# Patient Record
Sex: FEMALE | Race: WHITE | NOT HISPANIC OR LATINO | Employment: OTHER | URBAN - METROPOLITAN AREA
[De-identification: names, ages, dates, MRNs, and addresses within clinical notes are randomized per-mention and may not be internally consistent; named-entity substitution may affect disease eponyms.]

---

## 2017-01-27 ENCOUNTER — OFFICE VISIT - HEALTHEAST (OUTPATIENT)
Dept: FAMILY MEDICINE | Facility: CLINIC | Age: 52
End: 2017-01-27

## 2017-01-27 DIAGNOSIS — Z12.31 VISIT FOR SCREENING MAMMOGRAM: ICD-10-CM

## 2017-01-27 DIAGNOSIS — R31.9 HEMATURIA: ICD-10-CM

## 2017-01-27 DIAGNOSIS — S33.5XXA LUMBAR SPRAIN, INITIAL ENCOUNTER: ICD-10-CM

## 2017-01-27 ASSESSMENT — MIFFLIN-ST. JEOR: SCORE: 1179.88

## 2017-01-30 ENCOUNTER — AMBULATORY - HEALTHEAST (OUTPATIENT)
Dept: LAB | Facility: CLINIC | Age: 52
End: 2017-01-30

## 2017-01-30 DIAGNOSIS — R31.9 HEMATURIA: ICD-10-CM

## 2017-02-01 ENCOUNTER — OFFICE VISIT - HEALTHEAST (OUTPATIENT)
Dept: PHYSICAL THERAPY | Facility: REHABILITATION | Age: 52
End: 2017-02-01

## 2017-02-01 ENCOUNTER — COMMUNICATION - HEALTHEAST (OUTPATIENT)
Dept: FAMILY MEDICINE | Facility: CLINIC | Age: 52
End: 2017-02-01

## 2017-02-01 DIAGNOSIS — M54.50 ACUTE LEFT-SIDED LOW BACK PAIN WITHOUT SCIATICA: ICD-10-CM

## 2017-02-01 DIAGNOSIS — M62.81 GENERALIZED MUSCLE WEAKNESS: ICD-10-CM

## 2017-02-01 DIAGNOSIS — R29.3 ABNORMAL POSTURE: ICD-10-CM

## 2017-02-01 DIAGNOSIS — M62.89 MUSCLE TIGHTNESS: ICD-10-CM

## 2017-02-03 ENCOUNTER — COMMUNICATION - HEALTHEAST (OUTPATIENT)
Dept: FAMILY MEDICINE | Facility: CLINIC | Age: 52
End: 2017-02-03

## 2017-02-08 ENCOUNTER — OFFICE VISIT - HEALTHEAST (OUTPATIENT)
Dept: PHYSICAL THERAPY | Facility: REHABILITATION | Age: 52
End: 2017-02-08

## 2017-02-08 DIAGNOSIS — M54.50 ACUTE LEFT-SIDED LOW BACK PAIN WITHOUT SCIATICA: ICD-10-CM

## 2017-02-08 DIAGNOSIS — M62.89 MUSCLE TIGHTNESS: ICD-10-CM

## 2017-02-08 DIAGNOSIS — M62.81 GENERALIZED MUSCLE WEAKNESS: ICD-10-CM

## 2017-02-08 DIAGNOSIS — R29.3 ABNORMAL POSTURE: ICD-10-CM

## 2017-03-24 ENCOUNTER — RECORDS - HEALTHEAST (OUTPATIENT)
Dept: MAMMOGRAPHY | Facility: CLINIC | Age: 52
End: 2017-03-24

## 2017-03-24 DIAGNOSIS — Z12.31 ENCOUNTER FOR SCREENING MAMMOGRAM FOR MALIGNANT NEOPLASM OF BREAST: ICD-10-CM

## 2017-05-08 ENCOUNTER — COMMUNICATION - HEALTHEAST (OUTPATIENT)
Dept: FAMILY MEDICINE | Facility: CLINIC | Age: 52
End: 2017-05-08
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
## 2017-11-01 ENCOUNTER — AMBULATORY - HEALTHEAST (OUTPATIENT)
Dept: NURSING | Facility: CLINIC | Age: 52
End: 2017-11-01

## 2017-11-01 ENCOUNTER — AMBULATORY - HEALTHEAST (OUTPATIENT)
Dept: LAB | Facility: CLINIC | Age: 52
End: 2017-11-01

## 2017-11-01 ENCOUNTER — AMBULATORY - HEALTHEAST (OUTPATIENT)
Dept: FAMILY MEDICINE | Facility: CLINIC | Age: 52
End: 2017-11-01

## 2017-11-01 ENCOUNTER — COMMUNICATION - HEALTHEAST (OUTPATIENT)
Dept: FAMILY MEDICINE | Facility: CLINIC | Age: 52
End: 2017-11-01

## 2017-11-01 DIAGNOSIS — R30.0 DYSURIA: ICD-10-CM

## 2017-11-01 DIAGNOSIS — Z23 NEED FOR IMMUNIZATION AGAINST INFLUENZA: ICD-10-CM

## 2018-01-16 ENCOUNTER — COMMUNICATION - HEALTHEAST (OUTPATIENT)
Dept: FAMILY MEDICINE | Facility: CLINIC | Age: 53
End: 2018-01-16

## 2018-01-19 ENCOUNTER — AMBULATORY - HEALTHEAST (OUTPATIENT)
Dept: NURSING | Facility: CLINIC | Age: 53
End: 2018-01-19

## 2018-02-11 ENCOUNTER — COMMUNICATION - HEALTHEAST (OUTPATIENT)
Dept: FAMILY MEDICINE | Facility: CLINIC | Age: 53
End: 2018-02-11

## 2018-02-20 ENCOUNTER — OFFICE VISIT - HEALTHEAST (OUTPATIENT)
Dept: FAMILY MEDICINE | Facility: CLINIC | Age: 53
End: 2018-02-20

## 2018-02-20 DIAGNOSIS — E78.5 HYPERLIPIDEMIA: ICD-10-CM

## 2018-02-20 DIAGNOSIS — I10 ESSENTIAL HYPERTENSION, BENIGN: ICD-10-CM

## 2018-02-20 DIAGNOSIS — E55.9 VITAMIN D DEFICIENCY: ICD-10-CM

## 2018-02-20 DIAGNOSIS — R00.2 PALPITATIONS: ICD-10-CM

## 2018-02-20 DIAGNOSIS — Z00.00 ROUTINE GENERAL MEDICAL EXAMINATION AT A HEALTH CARE FACILITY: ICD-10-CM

## 2018-02-20 LAB
ALBUMIN SERPL-MCNC: 4.1 G/DL (ref 3.5–5)
ALP SERPL-CCNC: 96 U/L (ref 45–120)
ALT SERPL W P-5'-P-CCNC: 17 U/L (ref 0–45)
ANION GAP SERPL CALCULATED.3IONS-SCNC: 7 MMOL/L (ref 5–18)
AST SERPL W P-5'-P-CCNC: 19 U/L (ref 0–40)
BASOPHILS # BLD AUTO: 0 THOU/UL (ref 0–0.2)
BASOPHILS NFR BLD AUTO: 1 % (ref 0–2)
BILIRUB SERPL-MCNC: 0.6 MG/DL (ref 0–1)
BUN SERPL-MCNC: 9 MG/DL (ref 8–22)
CALCIUM SERPL-MCNC: 10 MG/DL (ref 8.5–10.5)
CHLORIDE BLD-SCNC: 98 MMOL/L (ref 98–107)
CHOLEST SERPL-MCNC: 249 MG/DL
CO2 SERPL-SCNC: 30 MMOL/L (ref 22–31)
CREAT SERPL-MCNC: 0.69 MG/DL (ref 0.6–1.1)
EOSINOPHIL # BLD AUTO: 0.1 THOU/UL (ref 0–0.4)
EOSINOPHIL NFR BLD AUTO: 2 % (ref 0–6)
ERYTHROCYTE [DISTWIDTH] IN BLOOD BY AUTOMATED COUNT: 12.9 % (ref 11–14.5)
FASTING STATUS PATIENT QL REPORTED: YES
GFR SERPL CREATININE-BSD FRML MDRD: >60 ML/MIN/1.73M2
GLUCOSE BLD-MCNC: 96 MG/DL (ref 70–125)
HCT VFR BLD AUTO: 40.6 % (ref 35–47)
HDLC SERPL-MCNC: 75 MG/DL
HGB BLD-MCNC: 14.2 G/DL (ref 12–16)
LDLC SERPL CALC-MCNC: 157 MG/DL
LYMPHOCYTES # BLD AUTO: 1.1 THOU/UL (ref 0.8–4.4)
LYMPHOCYTES NFR BLD AUTO: 27 % (ref 20–40)
MCH RBC QN AUTO: 31.2 PG (ref 27–34)
MCHC RBC AUTO-ENTMCNC: 35 G/DL (ref 32–36)
MCV RBC AUTO: 89 FL (ref 80–100)
MONOCYTES # BLD AUTO: 0.2 THOU/UL (ref 0–0.9)
MONOCYTES NFR BLD AUTO: 6 % (ref 2–10)
NEUTROPHILS # BLD AUTO: 2.7 THOU/UL (ref 2–7.7)
NEUTROPHILS NFR BLD AUTO: 65 % (ref 50–70)
PLATELET # BLD AUTO: 250 THOU/UL (ref 140–440)
PMV BLD AUTO: 9 FL (ref 7–10)
POTASSIUM BLD-SCNC: 4.3 MMOL/L (ref 3.5–5)
PROT SERPL-MCNC: 7.1 G/DL (ref 6–8)
RBC # BLD AUTO: 4.56 MILL/UL (ref 3.8–5.4)
SODIUM SERPL-SCNC: 135 MMOL/L (ref 136–145)
TRIGL SERPL-MCNC: 85 MG/DL
WBC: 4.2 THOU/UL (ref 4–11)

## 2018-02-20 ASSESSMENT — MIFFLIN-ST. JEOR: SCORE: 1188.95

## 2018-02-21 ENCOUNTER — HOSPITAL ENCOUNTER (OUTPATIENT)
Dept: CARDIOLOGY | Facility: HOSPITAL | Age: 53
Discharge: HOME | End: 2018-02-21
Attending: FAMILY MEDICINE

## 2018-02-21 DIAGNOSIS — R00.2 PALPITATIONS: ICD-10-CM

## 2018-02-21 LAB — 25(OH)D3 SERPL-MCNC: 43.3 NG/ML (ref 30–80)

## 2018-02-26 ENCOUNTER — COMMUNICATION - HEALTHEAST (OUTPATIENT)
Dept: FAMILY MEDICINE | Facility: CLINIC | Age: 53
End: 2018-02-26

## 2018-03-27 ENCOUNTER — COMMUNICATION - HEALTHEAST (OUTPATIENT)
Dept: FAMILY MEDICINE | Facility: CLINIC | Age: 53
End: 2018-03-27

## 2018-05-11 ENCOUNTER — RECORDS - HEALTHEAST (OUTPATIENT)
Dept: MAMMOGRAPHY | Facility: CLINIC | Age: 53
End: 2018-05-11

## 2018-05-11 DIAGNOSIS — Z12.31 ENCOUNTER FOR SCREENING MAMMOGRAM FOR MALIGNANT NEOPLASM OF BREAST: ICD-10-CM

## 2018-07-31 ENCOUNTER — OFFICE VISIT - HEALTHEAST (OUTPATIENT)
Dept: FAMILY MEDICINE | Facility: CLINIC | Age: 53
End: 2018-07-31

## 2018-07-31 DIAGNOSIS — R30.9 PAINFUL URINATION: ICD-10-CM

## 2018-07-31 DIAGNOSIS — M62.81 GENERALIZED MUSCLE WEAKNESS: ICD-10-CM

## 2018-07-31 LAB
ALBUMIN UR-MCNC: NEGATIVE MG/DL
APPEARANCE UR: CLEAR
BILIRUB UR QL STRIP: NEGATIVE
COLOR UR AUTO: YELLOW
DEPRECATED S PYO AG THROAT QL EIA: NORMAL
ERYTHROCYTE [DISTWIDTH] IN BLOOD BY AUTOMATED COUNT: 11.5 % (ref 11–14.5)
GLUCOSE UR STRIP-MCNC: NEGATIVE MG/DL
HCT VFR BLD AUTO: 43.3 % (ref 35–47)
HGB BLD-MCNC: 14.5 G/DL (ref 12–16)
HGB UR QL STRIP: NEGATIVE
KETONES UR STRIP-MCNC: NEGATIVE MG/DL
LEUKOCYTE ESTERASE UR QL STRIP: NEGATIVE
MCH RBC QN AUTO: 29.7 PG (ref 27–34)
MCHC RBC AUTO-ENTMCNC: 33.4 G/DL (ref 32–36)
MCV RBC AUTO: 89 FL (ref 80–100)
NITRATE UR QL: NEGATIVE
PH UR STRIP: 7.5 [PH] (ref 5–8)
PLATELET # BLD AUTO: 269 THOU/UL (ref 140–440)
PMV BLD AUTO: 8.7 FL (ref 7–10)
RBC # BLD AUTO: 4.86 MILL/UL (ref 3.8–5.4)
SP GR UR STRIP: 1.01 (ref 1–1.03)
UROBILINOGEN UR STRIP-ACNC: NORMAL
WBC: 5.4 THOU/UL (ref 4–11)

## 2018-08-01 LAB
GROUP A STREP BY PCR: NORMAL
LYME TOTAL ANTIBODY - HISTORICAL: 0.05 INDEX VALUE

## 2018-08-02 LAB — B MICROTI IGG TITR SER: NORMAL {TITER}

## 2018-08-03 LAB
A PHAGOCYTOPH DNA BLD QL NAA+PROBE: NOT DETECTED
E CHAFFEENSIS DNA BLD QL NAA+PROBE: NOT DETECTED
E EWINGII DNA SPEC QL NAA+PROBE: NOT DETECTED
EHRLICHIA DNA SPEC QL NAA+PROBE: NOT DETECTED

## 2018-12-02 ENCOUNTER — OFFICE VISIT - HEALTHEAST (OUTPATIENT)
Dept: FAMILY MEDICINE | Facility: CLINIC | Age: 53
End: 2018-12-02

## 2018-12-02 DIAGNOSIS — B02.9 HERPES ZOSTER WITHOUT COMPLICATION: ICD-10-CM

## 2018-12-03 ENCOUNTER — COMMUNICATION - HEALTHEAST (OUTPATIENT)
Dept: SCHEDULING | Facility: CLINIC | Age: 53
End: 2018-12-03

## 2018-12-05 ENCOUNTER — OFFICE VISIT - HEALTHEAST (OUTPATIENT)
Dept: FAMILY MEDICINE | Facility: CLINIC | Age: 53
End: 2018-12-05

## 2018-12-05 DIAGNOSIS — B02.9 HERPES ZOSTER WITHOUT COMPLICATION: ICD-10-CM

## 2018-12-05 ASSESSMENT — MIFFLIN-ST. JEOR: SCORE: 1153.99

## 2018-12-07 ENCOUNTER — COMMUNICATION - HEALTHEAST (OUTPATIENT)
Dept: FAMILY MEDICINE | Facility: CLINIC | Age: 53
End: 2018-12-07

## 2019-02-10 ENCOUNTER — COMMUNICATION - HEALTHEAST (OUTPATIENT)
Dept: FAMILY MEDICINE | Facility: CLINIC | Age: 54
End: 2019-02-10

## 2019-06-03 ENCOUNTER — OFFICE VISIT - HEALTHEAST (OUTPATIENT)
Dept: FAMILY MEDICINE | Facility: CLINIC | Age: 54
End: 2019-06-03

## 2019-06-03 DIAGNOSIS — Z80.3 FAMILY HISTORY OF BREAST CANCER IN MOTHER: ICD-10-CM

## 2019-06-03 DIAGNOSIS — G56.23 ULNAR NEUROPATHY OF BOTH UPPER EXTREMITIES: ICD-10-CM

## 2019-06-03 DIAGNOSIS — G56.03 BILATERAL CARPAL TUNNEL SYNDROME: ICD-10-CM

## 2019-06-03 DIAGNOSIS — Z13.220 SCREENING FOR CHOLESTEROL LEVEL: ICD-10-CM

## 2019-06-03 DIAGNOSIS — Z12.4 SCREENING FOR CERVICAL CANCER: ICD-10-CM

## 2019-06-03 DIAGNOSIS — E55.9 VITAMIN D DEFICIENCY: ICD-10-CM

## 2019-06-03 DIAGNOSIS — I10 ESSENTIAL HYPERTENSION, BENIGN: ICD-10-CM

## 2019-06-03 DIAGNOSIS — Z13.1 SCREENING FOR DIABETES MELLITUS: ICD-10-CM

## 2019-06-03 DIAGNOSIS — N95.1 MENOPAUSAL SYNDROME (HOT FLASHES): ICD-10-CM

## 2019-06-03 DIAGNOSIS — Z13.0 SCREENING FOR DEFICIENCY ANEMIA: ICD-10-CM

## 2019-06-03 DIAGNOSIS — Z12.31 VISIT FOR SCREENING MAMMOGRAM: ICD-10-CM

## 2019-06-03 DIAGNOSIS — Z00.00 ROUTINE GENERAL MEDICAL EXAMINATION AT A HEALTH CARE FACILITY: ICD-10-CM

## 2019-06-03 LAB
ANION GAP SERPL CALCULATED.3IONS-SCNC: 8 MMOL/L (ref 5–18)
BUN SERPL-MCNC: 12 MG/DL (ref 8–22)
CALCIUM SERPL-MCNC: 10.4 MG/DL (ref 8.5–10.5)
CHLORIDE BLD-SCNC: 100 MMOL/L (ref 98–107)
CHOLEST SERPL-MCNC: 267 MG/DL
CO2 SERPL-SCNC: 30 MMOL/L (ref 22–31)
CREAT SERPL-MCNC: 0.69 MG/DL (ref 0.6–1.1)
FASTING STATUS PATIENT QL REPORTED: YES
GFR SERPL CREATININE-BSD FRML MDRD: >60 ML/MIN/1.73M2
GLUCOSE BLD-MCNC: 91 MG/DL (ref 70–125)
HDLC SERPL-MCNC: 84 MG/DL
HGB BLD-MCNC: 14.5 G/DL (ref 12–16)
LDLC SERPL CALC-MCNC: 167 MG/DL
POTASSIUM BLD-SCNC: 4.5 MMOL/L (ref 3.5–5)
SODIUM SERPL-SCNC: 138 MMOL/L (ref 136–145)
TRIGL SERPL-MCNC: 82 MG/DL

## 2019-06-03 ASSESSMENT — MIFFLIN-ST. JEOR: SCORE: 1145.86

## 2019-06-04 ENCOUNTER — AMBULATORY - HEALTHEAST (OUTPATIENT)
Dept: NURSING | Facility: CLINIC | Age: 54
End: 2019-06-04

## 2019-06-04 DIAGNOSIS — Z00.00 ROUTINE GENERAL MEDICAL EXAMINATION AT A HEALTH CARE FACILITY: ICD-10-CM

## 2019-06-04 LAB
25(OH)D3 SERPL-MCNC: 42 NG/ML (ref 30–80)
25(OH)D3 SERPL-MCNC: 42 NG/ML (ref 30–80)
HPV SOURCE: NORMAL
HUMAN PAPILLOMA VIRUS 16 DNA: NEGATIVE
HUMAN PAPILLOMA VIRUS 18 DNA: NEGATIVE
HUMAN PAPILLOMA VIRUS FINAL DIAGNOSIS: NORMAL
HUMAN PAPILLOMA VIRUS OTHER HR: NEGATIVE
SPECIMEN DESCRIPTION: NORMAL

## 2019-06-12 ENCOUNTER — RECORDS - HEALTHEAST (OUTPATIENT)
Dept: MAMMOGRAPHY | Facility: CLINIC | Age: 54
End: 2019-06-12

## 2019-06-12 ENCOUNTER — COMMUNICATION - HEALTHEAST (OUTPATIENT)
Dept: FAMILY MEDICINE | Facility: CLINIC | Age: 54
End: 2019-06-12

## 2019-06-12 DIAGNOSIS — Z12.31 ENCOUNTER FOR SCREENING MAMMOGRAM FOR MALIGNANT NEOPLASM OF BREAST: ICD-10-CM

## 2019-06-12 DIAGNOSIS — Z80.3 FAMILY HISTORY OF MALIGNANT NEOPLASM OF BREAST: ICD-10-CM

## 2019-06-24 ENCOUNTER — RECORDS - HEALTHEAST (OUTPATIENT)
Dept: ADMINISTRATIVE | Facility: OTHER | Age: 54
End: 2019-06-24

## 2019-06-28 ENCOUNTER — RECORDS - HEALTHEAST (OUTPATIENT)
Dept: ADMINISTRATIVE | Facility: OTHER | Age: 54
End: 2019-06-28

## 2019-07-29 ENCOUNTER — COMMUNICATION - HEALTHEAST (OUTPATIENT)
Dept: FAMILY MEDICINE | Facility: CLINIC | Age: 54
End: 2019-07-29
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
## 2019-08-12 ENCOUNTER — COMMUNICATION - HEALTHEAST (OUTPATIENT)
Dept: SCHEDULING | Facility: CLINIC | Age: 54
End: 2019-08-12

## 2019-08-13 ENCOUNTER — RECORDS - HEALTHEAST (OUTPATIENT)
Dept: GENERAL RADIOLOGY | Facility: CLINIC | Age: 54
End: 2019-08-13

## 2019-08-13 ENCOUNTER — OFFICE VISIT - HEALTHEAST (OUTPATIENT)
Dept: FAMILY MEDICINE | Facility: CLINIC | Age: 54
End: 2019-08-13

## 2019-08-13 DIAGNOSIS — S20.219A CONTUSION OF UNSPECIFIED FRONT WALL OF THORAX, INITIAL ENCOUNTER: ICD-10-CM

## 2019-08-13 DIAGNOSIS — S20.219A CONTUSION OF RIB, UNSPECIFIED LATERALITY, INITIAL ENCOUNTER: ICD-10-CM
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
## 2019-08-14 ENCOUNTER — AMBULATORY - HEALTHEAST (OUTPATIENT)
Dept: FAMILY MEDICINE | Facility: CLINIC | Age: 54
End: 2019-08-14
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
## 2019-08-14 LAB — B BURGDOR IGG+IGM SER QL: 0.05 INDEX VALUE

## 2019-08-17 LAB
A PHAGOCYTOPH DNA BLD QL NAA+PROBE: NOT DETECTED
B MICROTI DNA BLD QL NAA+PROBE: NOT DETECTED
BABESIA DNA BLD QL NAA+PROBE: NOT DETECTED
E CHAFFEENSIS DNA BLD QL NAA+PROBE: NOT DETECTED
E EWINGII DNA SPEC QL NAA+PROBE: NOT DETECTED
EHRLICHIA DNA SPEC QL NAA+PROBE: NOT DETECTED

## 2019-09-12 ENCOUNTER — COMMUNICATION - HEALTHEAST (OUTPATIENT)
Dept: FAMILY MEDICINE | Facility: CLINIC | Age: 54
End: 2019-09-12

## 2019-09-17 ENCOUNTER — AMBULATORY - HEALTHEAST (OUTPATIENT)
Dept: NURSING | Facility: CLINIC | Age: 54
End: 2019-09-17

## 2019-10-10 ENCOUNTER — COMMUNICATION - HEALTHEAST (OUTPATIENT)
Dept: FAMILY MEDICINE | Facility: CLINIC | Age: 54
End: 2019-10-10

## 2019-10-10 DIAGNOSIS — N95.1 MENOPAUSAL SYNDROME (HOT FLASHES): ICD-10-CM

## 2019-11-01 ENCOUNTER — AMBULATORY - HEALTHEAST (OUTPATIENT)
Dept: FAMILY MEDICINE | Facility: CLINIC | Age: 54
End: 2019-11-01

## 2019-11-05 ENCOUNTER — AMBULATORY - HEALTHEAST (OUTPATIENT)
Dept: NURSING | Facility: CLINIC | Age: 54
End: 2019-11-05

## 2019-11-11 ENCOUNTER — AMBULATORY - HEALTHEAST (OUTPATIENT)
Dept: LAB | Facility: CLINIC | Age: 54
End: 2019-11-11

## 2019-11-11 ENCOUNTER — AMBULATORY - HEALTHEAST (OUTPATIENT)
Dept: FAMILY MEDICINE | Facility: CLINIC | Age: 54
End: 2019-11-11

## 2019-11-11 DIAGNOSIS — R30.0 DYSURIA: ICD-10-CM

## 2019-11-11 LAB
ALBUMIN UR-MCNC: NEGATIVE MG/DL
APPEARANCE UR: CLEAR
BILIRUB UR QL STRIP: NEGATIVE
COLOR UR AUTO: YELLOW
GLUCOSE UR STRIP-MCNC: NEGATIVE MG/DL
HGB UR QL STRIP: NEGATIVE
KETONES UR STRIP-MCNC: NEGATIVE MG/DL
LEUKOCYTE ESTERASE UR QL STRIP: NEGATIVE
NITRATE UR QL: NEGATIVE
PH UR STRIP: 7.5 [PH] (ref 5–8)
SP GR UR STRIP: 1.01 (ref 1–1.03)
UROBILINOGEN UR STRIP-ACNC: NORMAL

## 2019-12-11 ENCOUNTER — RECORDS - HEALTHEAST (OUTPATIENT)
Dept: ADMINISTRATIVE | Facility: OTHER | Age: 54
End: 2019-12-11

## 2019-12-19 ENCOUNTER — RECORDS - HEALTHEAST (OUTPATIENT)
Dept: ADMINISTRATIVE | Facility: OTHER | Age: 54
End: 2019-12-19

## 2020-02-09 ENCOUNTER — COMMUNICATION - HEALTHEAST (OUTPATIENT)
Dept: FAMILY MEDICINE | Facility: CLINIC | Age: 55
End: 2020-02-09

## 2020-02-09 DIAGNOSIS — I10 ESSENTIAL HYPERTENSION, BENIGN: ICD-10-CM

## 2020-06-15 ENCOUNTER — COMMUNICATION - HEALTHEAST (OUTPATIENT)
Dept: FAMILY MEDICINE | Facility: CLINIC | Age: 55
End: 2020-06-15

## 2020-06-15 DIAGNOSIS — N95.1 MENOPAUSAL SYNDROME (HOT FLASHES): ICD-10-CM

## 2020-06-30 ENCOUNTER — COMMUNICATION - HEALTHEAST (OUTPATIENT)
Dept: FAMILY MEDICINE | Facility: CLINIC | Age: 55
End: 2020-06-30

## 2020-07-20 ENCOUNTER — OFFICE VISIT - HEALTHEAST (OUTPATIENT)
Dept: FAMILY MEDICINE | Facility: CLINIC | Age: 55
End: 2020-07-20

## 2020-07-20 DIAGNOSIS — J02.9 PHARYNGITIS, UNSPECIFIED ETIOLOGY: ICD-10-CM

## 2020-07-20 DIAGNOSIS — Z20.9 EXPOSURE TO POTENTIAL INFECTION: ICD-10-CM

## 2020-07-20 DIAGNOSIS — F33.8 SEASONAL AFFECTIVE DISORDER (H): ICD-10-CM

## 2020-07-20 DIAGNOSIS — R51.9 NONINTRACTABLE HEADACHE, UNSPECIFIED CHRONICITY PATTERN, UNSPECIFIED HEADACHE TYPE: ICD-10-CM

## 2020-07-20 DIAGNOSIS — R63.0 POOR APPETITE: ICD-10-CM

## 2020-07-20 DIAGNOSIS — R19.7 DIARRHEA, UNSPECIFIED TYPE: ICD-10-CM

## 2020-07-20 LAB — DEPRECATED S PYO AG THROAT QL EIA: NORMAL

## 2020-07-20 NOTE — ASSESSMENT & PLAN NOTE
"2 days   Suddenly Late PM 5 PM  Nausea No  Vomiting No   Photo or PhonoNo    Like a \" comes and goes\"  Minor  Taste \"nothing taste good\"    "

## 2020-07-21 ENCOUNTER — COMMUNICATION - HEALTHEAST (OUTPATIENT)
Dept: FAMILY MEDICINE | Facility: CLINIC | Age: 55
End: 2020-07-21

## 2020-07-21 LAB — GROUP A STREP BY PCR: NORMAL

## 2020-07-22 LAB
B MICROTI IGG TITR SER: NORMAL {TITER}
E CHAFFEENSIS IGG TITR SER IF: NORMAL {TITER}
E CHAFFEENSIS IGM TITR SER IF: NORMAL {TITER}

## 2020-07-23 ENCOUNTER — COMMUNICATION - HEALTHEAST (OUTPATIENT)
Dept: FAMILY MEDICINE | Facility: CLINIC | Age: 55
End: 2020-07-23

## 2020-07-24 ENCOUNTER — COMMUNICATION - HEALTHEAST (OUTPATIENT)
Dept: FAMILY MEDICINE | Facility: CLINIC | Age: 55
End: 2020-07-24

## 2020-07-24 LAB
B BURGDOR AB SER-IMP: NORMAL
LYME AB IGG BAND(S): NORMAL
LYME AB IGM BAND(S): NORMAL
LYME IGG BLOT: NEGATIVE
LYME IGM BLOT: NEGATIVE

## 2020-07-26 ENCOUNTER — COMMUNICATION - HEALTHEAST (OUTPATIENT)
Dept: FAMILY MEDICINE | Facility: CLINIC | Age: 55
End: 2020-07-26

## 2020-07-27 ENCOUNTER — HOSPITAL ENCOUNTER (OUTPATIENT)
Dept: MAMMOGRAPHY | Facility: CLINIC | Age: 55
Discharge: HOME | End: 2020-07-27
Attending: NURSE PRACTITIONER

## 2020-07-27 ENCOUNTER — AMBULATORY - HEALTHEAST (OUTPATIENT)
Dept: FAMILY MEDICINE | Facility: CLINIC | Age: 55
End: 2020-07-27

## 2020-07-27 DIAGNOSIS — Z12.31 VISIT FOR SCREENING MAMMOGRAM: ICD-10-CM

## 2020-07-27 DIAGNOSIS — Z20.9 EXPOSURE TO POTENTIAL INFECTION: ICD-10-CM

## 2020-08-17 ENCOUNTER — COMMUNICATION - HEALTHEAST (OUTPATIENT)
Dept: FAMILY MEDICINE | Facility: CLINIC | Age: 55
End: 2020-08-17

## 2020-08-17 ENCOUNTER — AMBULATORY - HEALTHEAST (OUTPATIENT)
Dept: FAMILY MEDICINE | Facility: CLINIC | Age: 55
End: 2020-08-17

## 2020-08-17 DIAGNOSIS — Z20.9 EXPOSURE TO POTENTIAL INFECTION: ICD-10-CM

## 2020-08-18 ENCOUNTER — AMBULATORY - HEALTHEAST (OUTPATIENT)
Dept: LAB | Facility: CLINIC | Age: 55
End: 2020-08-18

## 2020-08-18 DIAGNOSIS — Z20.9 EXPOSURE TO POTENTIAL INFECTION: ICD-10-CM

## 2020-08-20 ENCOUNTER — COMMUNICATION - HEALTHEAST (OUTPATIENT)
Dept: FAMILY MEDICINE | Facility: CLINIC | Age: 55
End: 2020-08-20

## 2020-08-21 ENCOUNTER — COMMUNICATION - HEALTHEAST (OUTPATIENT)
Dept: SCHEDULING | Facility: CLINIC | Age: 55
End: 2020-08-21

## 2020-08-29 ENCOUNTER — COMMUNICATION - HEALTHEAST (OUTPATIENT)
Dept: FAMILY MEDICINE | Facility: CLINIC | Age: 55
End: 2020-08-29

## 2020-08-29 DIAGNOSIS — I10 ESSENTIAL HYPERTENSION, BENIGN: ICD-10-CM

## 2020-08-31 ENCOUNTER — COMMUNICATION - HEALTHEAST (OUTPATIENT)
Dept: FAMILY MEDICINE | Facility: CLINIC | Age: 55
End: 2020-08-31

## 2020-08-31 DIAGNOSIS — I10 ESSENTIAL HYPERTENSION, BENIGN: ICD-10-CM

## 2020-08-31 RX ORDER — LISINOPRIL/HYDROCHLOROTHIAZIDE 10-12.5 MG
1 TABLET ORAL DAILY
Qty: 90 TABLET | Refills: 3 | Status: SHIPPED | OUTPATIENT
Start: 2020-08-31 | End: 2021-08-26

## 2020-10-28 ENCOUNTER — RECORDS - HEALTHEAST (OUTPATIENT)
Dept: ADMINISTRATIVE | Facility: OTHER | Age: 55
End: 2020-10-28

## 2020-11-12 ENCOUNTER — AMBULATORY - HEALTHEAST (OUTPATIENT)
Dept: LAB | Facility: CLINIC | Age: 55
End: 2020-11-12

## 2020-11-12 ENCOUNTER — COMMUNICATION - HEALTHEAST (OUTPATIENT)
Dept: SCHEDULING | Facility: CLINIC | Age: 55
End: 2020-11-12

## 2020-11-12 DIAGNOSIS — R30.0 DYSURIA: ICD-10-CM

## 2020-11-12 LAB
ALBUMIN UR-MCNC: NEGATIVE MG/DL
APPEARANCE UR: CLEAR
BILIRUB UR QL STRIP: NEGATIVE
COLOR UR AUTO: YELLOW
GLUCOSE UR STRIP-MCNC: NEGATIVE MG/DL
HGB UR QL STRIP: NEGATIVE
KETONES UR STRIP-MCNC: NEGATIVE MG/DL
LEUKOCYTE ESTERASE UR QL STRIP: NEGATIVE
NITRATE UR QL: NEGATIVE
PH UR STRIP: 7 [PH] (ref 5–8)
SP GR UR STRIP: 1.01 (ref 1–1.03)
UROBILINOGEN UR STRIP-ACNC: NORMAL

## 2021-02-03 ENCOUNTER — COMMUNICATION - HEALTHEAST (OUTPATIENT)
Dept: FAMILY MEDICINE | Facility: CLINIC | Age: 56
End: 2021-02-03

## 2021-02-03 DIAGNOSIS — N95.1 MENOPAUSAL SYNDROME (HOT FLASHES): ICD-10-CM

## 2021-04-01 ENCOUNTER — TRANSFERRED RECORDS (OUTPATIENT)
Dept: MULTI SPECIALTY CLINIC | Facility: CLINIC | Age: 56
End: 2021-04-01

## 2021-04-01 LAB — PAP SMEAR - HIM PATIENT REPORTED: NORMAL

## 2021-04-19 ENCOUNTER — OFFICE VISIT - HEALTHEAST (OUTPATIENT)
Dept: FAMILY MEDICINE | Facility: CLINIC | Age: 56
End: 2021-04-19

## 2021-04-19 DIAGNOSIS — I10 ESSENTIAL HYPERTENSION, BENIGN: ICD-10-CM

## 2021-04-19 DIAGNOSIS — F33.8 SEASONAL AFFECTIVE DISORDER (H): ICD-10-CM

## 2021-04-19 DIAGNOSIS — R06.02 SHORTNESS OF BREATH: ICD-10-CM

## 2021-04-19 LAB
ALBUMIN SERPL-MCNC: 4.2 G/DL (ref 3.5–5)
ALP SERPL-CCNC: 102 U/L (ref 45–120)
ALT SERPL W P-5'-P-CCNC: 17 U/L (ref 0–45)
ANION GAP SERPL CALCULATED.3IONS-SCNC: 13 MMOL/L (ref 5–18)
AST SERPL W P-5'-P-CCNC: 17 U/L (ref 0–40)
ATRIAL RATE - MUSE: 58 BPM
BILIRUB SERPL-MCNC: 0.2 MG/DL (ref 0–1)
BUN SERPL-MCNC: 14 MG/DL (ref 8–22)
CALCIUM SERPL-MCNC: 9.4 MG/DL (ref 8.5–10.5)
CHLORIDE BLD-SCNC: 97 MMOL/L (ref 98–107)
CO2 SERPL-SCNC: 23 MMOL/L (ref 22–31)
CREAT SERPL-MCNC: 0.72 MG/DL (ref 0.6–1.1)
D DIMER PPP FEU-MCNC: <=0.27 FEU UG/ML
DIASTOLIC BLOOD PRESSURE - MUSE: NORMAL
ERYTHROCYTE [DISTWIDTH] IN BLOOD BY AUTOMATED COUNT: 11.8 % (ref 11–14.5)
GFR SERPL CREATININE-BSD FRML MDRD: >60 ML/MIN/1.73M2
GLUCOSE BLD-MCNC: 104 MG/DL (ref 70–125)
HCT VFR BLD AUTO: 39.8 % (ref 35–47)
HGB BLD-MCNC: 13.3 G/DL (ref 12–16)
INTERPRETATION ECG - MUSE: NORMAL
MCH RBC QN AUTO: 30.2 PG (ref 27–34)
MCHC RBC AUTO-ENTMCNC: 33.4 G/DL (ref 32–36)
MCV RBC AUTO: 91 FL (ref 80–100)
P AXIS - MUSE: 25 DEGREES
PLATELET # BLD AUTO: 271 THOU/UL (ref 140–440)
PMV BLD AUTO: 10.7 FL (ref 7–10)
POTASSIUM BLD-SCNC: 4.2 MMOL/L (ref 3.5–5)
PR INTERVAL - MUSE: 116 MS
PROT SERPL-MCNC: 6.8 G/DL (ref 6–8)
QRS DURATION - MUSE: 92 MS
QT - MUSE: 438 MS
QTC - MUSE: 429 MS
R AXIS - MUSE: 40 DEGREES
RBC # BLD AUTO: 4.4 MILL/UL (ref 3.8–5.4)
SODIUM SERPL-SCNC: 133 MMOL/L (ref 136–145)
SYSTOLIC BLOOD PRESSURE - MUSE: NORMAL
T AXIS - MUSE: 36 DEGREES
VENTRICULAR RATE- MUSE: 58 BPM
WBC: 5.9 THOU/UL (ref 4–11)

## 2021-04-21 ENCOUNTER — COMMUNICATION - HEALTHEAST (OUTPATIENT)
Dept: FAMILY MEDICINE | Facility: CLINIC | Age: 56
End: 2021-04-21

## 2021-04-30 ENCOUNTER — COMMUNICATION - HEALTHEAST (OUTPATIENT)
Dept: FAMILY MEDICINE | Facility: CLINIC | Age: 56
End: 2021-04-30

## 2021-04-30 DIAGNOSIS — N95.1 MENOPAUSAL SYNDROME (HOT FLASHES): ICD-10-CM

## 2021-05-02 RX ORDER — GABAPENTIN 100 MG/1
CAPSULE ORAL
Qty: 270 CAPSULE | Refills: 3 | Status: SHIPPED | OUTPATIENT
Start: 2021-05-02 | End: 2022-08-15

## 2021-05-29 NOTE — PROGRESS NOTES
FEMALE PREVENTIVE EXAM    Assessment & Plan   1. Routine general medical examination at a health care facility  Fasting labs, pap, order for Shingrix. Due for mammography and she will check with insurance on 3D mammogram.  She states not previously covered though she does have a h/o heterogeneously dense breasts and FMH of breast cancer in two first degree relatives.  Recommended requesting PA  - Varicella Zoster, Recombinant Vaccine IM; Future    2. Benign Essential Hypertension  Well controlled on current medication, labs today.   - Basic Metabolic Panel  - lisinopril-hydrochlorothiazide (PRINZIDE,ZESTORETIC) 10-12.5 mg per tablet; TAKE 1 TABLET DAILY  Dispense: 90 tablet; Refill: 3    3. Bilateral carpal tunnel syndrome  Exam and history consistent with bilateral carpal tunnel as well as ulnar neuropathy.  She does have braces at home and notes improvement after wearing them overnight.  Discussed possibility of carpal release surgery or PT.  Referral to orthopedics for consult and consider EMG testing.    - Ambulatory referral to Orthopedics    4. Ulnar neuropathy of both upper extremities  - Ambulatory referral to Orthopedics    5. Menopausal syndrome (hot flashes)  Reviewed various non-hormonal options for treatment.  She is struggling with primarily nighttime symptoms and insomnia related to these.  Will do a trial of gabapentin 100mg with titration up to 300mg as needed.  Advised on possible side effects.  Follow up in one month.  Did discuss possibility of SSRI as well if gabapentin is not effective or not tolerated.   - gabapentin (NEURONTIN) 100 MG capsule; Take 100-300mg at bedtime  Dispense: 60 capsule; Refill: 1    6. Family history of breast cancer in mother  - Mammo Screening Bilateral; Future    7. Visit for screening mammogram  - Mammo Screening Bilateral; Future    8. Screening for cervical cancer  - Gynecologic Cytology (PAP Smear)    9. Screening for cholesterol level  - Lipid Cascade    10.  Screening for diabetes mellitus  - Basic Metabolic Panel    11. Screening for deficiency anemia  - Hemoglobin    12. Vitamin D deficiency  - Vitamin D, Total (25-Hydroxy)    Recommend repeat pap smear if normal every five years, Recommended adequate calcium intake/osteoporosis prevention, Discussed breast cancer screening guidelines, Discussed colon cancer screening at age 50, 45 if -American and Discussed diet, including moderation of portions sizes, avoiding eating out and fast food and increase in fruits and vegetables    Ale Thomson, LUZ    Subjective:   Chief Complaint:  Establish Care and Annual Exam (fasting - pap)    HPI:  Maribell Cabrera is a 53 y.o. female who presents for routine physical exam.  She is a previous patient of Dr. Jenkins.  PMH notable for HTN, vitamin D deficiency, SAD, otherwise negative.      HTN:  Well controlled on lisinopril-HCTZ.     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  After she began to experience bilateral elbow pain between the medial epicondyle and olecranon.  States this has been evaluated several times in the past.  Radiation to the hand.  Awakens with bilateral numbness/tingling as well on a daily basis.  Repetitive movements with gardening, knitting.  No weakness noted in hands, good  strength.  Worse with twisting motions, again worst in AM.      Vasomotor symptoms: Three years postmenopause.  Still bothered by significant hot flashes and night sweats. Throughout the day though worse at night.  Awakening her.  FMH of breast cancer in mother and sister. Has not tried any other treatment options.     SAD:  Seasonally.  Believes depression has lifted this year.  Tolerable though not great.  Does not desire treatment specifically as she does well during the warmer months.     OB/Gyn History:  Menstrual history: post menopausal   Date of previous pap:  12/2014  History of abnormal pap: none    Preventive Health:  Reviewed and recommended screening and treatment recommendations:  Mammography: due  Colonoscopy: 2016, up to date  Immunizations: up to date, due for Shingrix    Health Habits:    Exercise: yes, regular walking, yoga and housework/gardening.  Calcium intake/Osteoporosis prevention: vitamin D 2000IU, calcium cheese. FMH of osteoporosis in mom.      PMH:   Patient Active Problem List   Diagnosis     Essential Hypercholesterolemia     Carpal Tunnel Syndrome     Benign Essential Hypertension     Allergic Rhinitis     Hallux Valgus     Vitamin D Deficiency     Seasonal affective disorder (H)       No past medical history on file.    Current Medications: Reviewed   Current Outpatient Medications on File Prior to Visit   Medication Sig Dispense Refill     cholecalciferol, vitamin D3, 1,000 unit tablet Take 2,000 Units by mouth daily.        lisinopril-hydrochlorothiazide (PRINZIDE,ZESTORETIC) 10-12.5 mg per tablet TAKE 1 TABLET DAILY 90 tablet 3     loratadine (CLARITIN) 10 mg tablet Take 10 mg by mouth daily.       multivitamin therapeutic (THERAGRAN) tablet        traMADol (ULTRAM) 50 mg tablet Take 1 tablet (50 mg total) by mouth every 6 (six) hours as needed for pain. 10 tablet 0     No current facility-administered medications on file prior to visit.        Allergies:  Reviewed  has No Known Allergies.    Social History:  Social History     Occupational History     Not on file   Tobacco Use     Smoking status: Never Smoker     Smokeless tobacco: Never Used   Substance and Sexual Activity     Alcohol use: Yes     Comment: Not much!     Drug use: No     Sexual activity: Yes     Partners: Male       Family History:   Family History   Problem Relation Age of Onset     Breast cancer Mother 50     Melanoma Father         @79     Cancer Father         gastro stromal tumor     Diabetes Father      Hypertension Father      Heart attack Maternal Grandfather       "Stroke Paternal Grandmother      Coronary artery disease Paternal Grandfather      Breast cancer Sister 54     Heart disease Maternal Uncle      Arthritis Sister      Diverticulosis Sister      KIRSTIE disease Sister          Review of Systems:  Complete head to toe review of systems is otherwise negative except as above.    Objective:    /70 (Patient Site: Right Arm, Patient Position: Sitting, Cuff Size: Adult Regular)   Pulse 64   Ht 5' 4.5\" (1.638 m)   Wt 123 lb (55.8 kg)   LMP 11/08/2014   BMI 20.79 kg/m      GENERAL: Alert, well-appearing female .   PSYCH: Pleasant mood, affect appropriate.    SKIN: No atypical lesions  EYES: Conjunctiva pink, sclera white, no exudates. MAXIMUS.  EOMs intact.   EARS: TMs pearly grey, no bulging, redness, retraction.   MOUTH: Pharynx moist, pink without exudate. No tonsillar enlargement  NECK: No lymphadenopathy. Thyroid borders smooth without enlargement, nodules.   CV: Regular rate and rhythm without murmurs, rubs or gallops.  RESP: Lung sounds clear, symmetric excursion.   ABDOMEN: BS+. Abdomen soft.  No organomegaly  BREASTS: Breasts symmetric, no dimpling, masses or skin discolorations seen. Areolas and nipples symmetric without discharge. On palpation, breast tissue supple and nontender. No masses or nodules. Axillary and epitrochlear lymph nodes nonpalpable.    FEMALE: External genitalia without lesions. Vaginal walls and cervix without lesions or masses. No abnormal discharge. Pap smear obtained. On bimanual palpation, uterus mobile, normal shape and contour. No adnexal masses or tenderness.   PV :  No edema  MSK:  Full ROM of elbows, wrists, fingers.  TTP over ulnar nerve in ulnar groove.  Replication of symptoms.  Positive carpal compression test, Tinel, Phalen.   strength 5/5 bilaterally.  No pain with resisted flexion, extension, pronation, supination.         "

## 2021-05-29 NOTE — PATIENT INSTRUCTIONS - HE
Recommendations from today's visit                                                       1. Ask your insurance company if you need a prior authorization for 3D mammography. I also tried coding this based on your family history.      2. For the wrists, I think this is a combination of carpal tunnel and ulnar neuropathy. We will get you set up for a consult with orthopedics.       Next appointment: one year, annual physical     To reschedule your appointment, please call the clinic directly at [phone].   It was a pleasure seeing you today! I look forward to seeing you again.

## 2021-05-30 VITALS — BODY MASS INDEX: 20.41 KG/M2 | WEIGHT: 127 LBS | HEIGHT: 66 IN

## 2021-05-31 NOTE — PROGRESS NOTES
Assessment & Plan   1. Tick bite, subsequent encounter  Vague symptoms of HA, fatigue, lethargy noted one week ago after sustaining two deer tick bites last month.  Was prescribed post bite treatment though she did not take this.  Now requesting Lyme testing. Discussed tests and typical timeframe to positive IgM, IgG.  Will check today and if negative repeat in two weeks.    - Lyme Antibody Cascade  - Babesia Species by PCR  - Ehrlichia and Anaplasma Species by Real-Time PCR    2. Contusion of rib, unspecified laterality, initial encounter  Rib pain following fall on chest. Xray to check for fracture. No dyspnea.   - XR Chest 2 Views; Future    Ale Thomson CNP    Subjective   Chief Complaint:  Headache; Fatigue (pt was bitten by two ticks recently; has had lymes disease in the past, sx started after she noticed the ticks); Extremity Weakness; and Fall (last wednesday pt fell in some sand and hurt L hand and her binoculars hit into her chest, she is having problems with pain in her sturnum, possibly bruised?)    HPI:   Maribell Cabrera is a 54 y.o. female who presents for Lyme evaluation.     She sent a [employer] message last month with concerns about tick bite July 16th and 28th.  Unsure of time of attachment, estimated at least 24 hours.  She was treated with a single dose of doxycycline 7/29.  She has a history of Lyme disease several years ago.  Today she states she never took the dose of doxycycline as she didn't want to mask symptoms.  Approximately 1.5 weeks ago she noted new onset headaches in the evening, fatigue and generalized feeling of weakness and lethargy.  No joint pain or swelling. No rashes.  Headaches have since completely resolved.  Would like Lyme testing.      Fall:  Fell on left outstretched hand. Also landed on chest with binoculars on chest.  L hand with swelling initially, has resolved.  Full ROM.  Mild pain. Anterior chest painful at sternum and ribs.  Hurts to inhale deeply.  No other  shortness of breath.       Allergies:  has No Known Allergies.    SH/FH:  Social History and Family History reviewed and updated.   Tobacco Status:  She  reports that she has never smoked. She has never used smokeless tobacco.    Review of Systems:  A complete head to toe ROS is negative unless otherwise noted in HPI    Objective     Vitals:    08/13/19 0917   BP: 129/84   Patient Site: Right Arm   Patient Position: Sitting   Cuff Size: Adult Regular   Pulse: (!) 54   Weight: 123 lb (55.8 kg)       Physical Exam:  GENERAL: Alert, well-appearing female .   PSYCH: Pleasant mood, affect appropriate.    SKIN: No rashes  EYES: Conjunctiva pink, sclera white, no exudates. MAXIMUS.  EOMs intact.   EARS: TMs pearly grey, no bulging, redness, retraction.   MOUTH: Pharynx moist, pink without exudate. No tonsillar enlargement  NECK: No lymphadenopathy. Thyroid borders smooth without enlargement, nodules.   CV: Regular rate and rhythm without murmurs, rubs or gallops.  RESP: Lung sounds clear  ABDOMEN: BS+. Abdomen soft, nontender to palpation without guarding. No organomegaly, masses or hernias  MSK: Sternum and bilateral ribs TTP at ribs 2-4.

## 2021-05-31 NOTE — TELEPHONE ENCOUNTER
Call from pt       CC: Tick bites  - fatigue and HA's          July 19th and then again July 28th   Ticks had been on for probably 24 hrs or so      Skin was red where bitten   Ticks not appear to be puffed up / swollen otherwise     Has had Lymes disease in the past      No rashes   A few bad headaches   Overall fatigue         A/P:   > Appt for eval as available     > Bites washed / cleaned         Paulino Jose, RN   Triage and Medication Refills        Reason for Disposition    Patient wants to be seen    Protocols used: TICK BITE-A-[place]

## 2021-06-01 VITALS — BODY MASS INDEX: 20.73 KG/M2 | WEIGHT: 129 LBS | HEIGHT: 66 IN

## 2021-06-01 VITALS — WEIGHT: 124.4 LBS | BODY MASS INDEX: 20.39 KG/M2

## 2021-06-02 VITALS — BODY MASS INDEX: 20.5 KG/M2 | WEIGHT: 123.04 LBS | HEIGHT: 65 IN

## 2021-06-02 VITALS — WEIGHT: 127.13 LBS | BODY MASS INDEX: 20.83 KG/M2

## 2021-06-02 NOTE — TELEPHONE ENCOUNTER
RN cannot approve Refill Request    RN can NOT refill this medication medication not on med list. Last office visit: 8/13/2019 Ale Thosmon CNP Last Physical: 6/3/2019 Last MTM visit: Visit date not found Last visit same specialty: 8/13/2019 Ale Thomson CNP.  Next visit within 3 mo: Visit date not found  Next physical within 3 mo: Visit date not found      Petty Acosta, [facility] Connection Triage/Med Refill 10/11/2019    Requested Prescriptions   Pending Prescriptions Disp Refills     gabapentin (NEURONTIN) 100 MG capsule [Pharmacy Med Name: GABAPENTIN 100 MG CAPSULE] 60 capsule 1     Sig: TAKE 1 TO 3 CAPSULES BY MOUTH DAILY AT BEDTIME       Gabapentin/Levetiracetam/Tiagabine Refill Protocol  Passed - 10/10/2019  3:21 PM        Passed - PCP or prescribing provider visit in past 12 months or next 3 months     Last office visit with prescriber/PCP: 8/13/2019 Ale Thomson CNP OR same dept: 8/13/2019 Ale Thomson CNP OR same specialty: 8/13/2019 Ale Thomson CNP  Last physical: 6/3/2019 Last MTM visit: Visit date not found   Next visit within 3 mo: Visit date not found  Next physical within 3 mo: Visit date not found  Prescriber OR PCP: Ale Thomson CNP  Last diagnosis associated with med order: 1. Menopausal syndrome (hot flashes)  - gabapentin (NEURONTIN) 100 MG capsule [Pharmacy Med Name: GABAPENTIN 100 MG CAPSULE]; TAKE 1 TO 3 CAPSULES BY MOUTH DAILY AT BEDTIME  Dispense: 60 capsule; Refill: 1    If protocol passes may refill for 12 months if within 3 months of last provider visit (or a total of 15 months).

## 2021-06-02 NOTE — TELEPHONE ENCOUNTER
Sent in refill for #60/month.  Please inquire how many capsules she is taking each night so we can send in an accurate prescription for her

## 2021-06-03 VITALS — BODY MASS INDEX: 20.49 KG/M2 | HEIGHT: 65 IN | WEIGHT: 123 LBS

## 2021-06-03 VITALS — BODY MASS INDEX: 20.79 KG/M2 | WEIGHT: 123 LBS

## 2021-06-04 VITALS
HEART RATE: 56 BPM | SYSTOLIC BLOOD PRESSURE: 126 MMHG | WEIGHT: 128 LBS | OXYGEN SATURATION: 99 % | DIASTOLIC BLOOD PRESSURE: 72 MMHG | BODY MASS INDEX: 21.63 KG/M2

## 2021-06-05 VITALS
TEMPERATURE: 98.5 F | SYSTOLIC BLOOD PRESSURE: 132 MMHG | HEART RATE: 51 BPM | WEIGHT: 130.5 LBS | BODY MASS INDEX: 22.05 KG/M2 | DIASTOLIC BLOOD PRESSURE: 64 MMHG | OXYGEN SATURATION: 100 %

## 2021-06-06 NOTE — TELEPHONE ENCOUNTER
Refill Approved    Rx renewed per Medication Renewal Policy. Medication was last renewed on 6/3/19- pharmacy change.    Razia Mcdaniel, [facility] Connection Triage/Med Refill 2/13/2020     Requested Prescriptions   Pending Prescriptions Disp Refills     lisinopril-hydrochlorothiazide (PRINZIDE,ZESTORETIC) 10-12.5 mg per tablet [Pharmacy Med Name: LISINOPRIL/HCTZ TABS 10/12.5MG] 90 tablet 4     Sig: TAKE 1 TABLET DAILY       Diuretics/Combination Diuretics Refill Protocol  Passed - 2/9/2020  7:34 AM        Passed - Visit with PCP or prescribing provider visit in past 12 months     Last office visit with prescriber/PCP: Visit date not found OR same dept: 8/13/2019 Ale Thomson CNP OR same specialty: 8/13/2019 Ale Thomson CNP  Last physical: Visit date not found Last MTM visit: Visit date not found   Next visit within 3 mo: Visit date not found  Next physical within 3 mo: Visit date not found  Prescriber OR PCP: Bean Singh MD  Last diagnosis associated with med order: 1. Benign Essential Hypertension  - lisinopril-hydrochlorothiazide (PRINZIDE,ZESTORETIC) 10-12.5 mg per tablet [Pharmacy Med Name: LISINOPRIL/HCTZ TABS 10/12.5MG]; TAKE 1 TABLET DAILY  Dispense: 90 tablet; Refill: 4    If protocol passes may refill for 12 months if within 3 months of last provider visit (or a total of 15 months).             Passed - Serum Potassium in past 12 months      Lab Results   Component Value Date    Potassium 4.5 06/03/2019             Passed - Serum Sodium in past 12 months      Lab Results   Component Value Date    Sodium 138 06/03/2019             Passed - Blood pressure on file in past 12 months     BP Readings from Last 1 Encounters:   08/13/19 129/84             Passed - Serum Creatinine in past 12 months      Creatinine   Date Value Ref Range Status   06/03/2019 0.69 0.60 - 1.10 mg/dL Final

## 2021-06-08 NOTE — PROGRESS NOTES
[facility]   [place]-Pelvic Initial Evaluation    Patient Name: Maribell Cabrera  Date of evaluation: 2/1/2017  Referral Diagnosis: Lumbar sprain, initial encounter  Referring provider: Deborah Jenkins*  Visit Diagnosis:     ICD-10-CM    1. Acute left-sided low back pain without sciatica M54.5    2. Abnormal posture R29.3    3. Generalized muscle weakness M62.81    4. Muscle tightness M62.89        Assessment:     Maribell Cabrera is a 51 y.o. female who presents to therapy today with chief complaints of L sided low back pain that has been ongoing since aggressive massage on 12/10/16. Pain has slowly improved, but occasional sharp and achy pain and L groin pain. No neurological findings during evaluation. Pt presents with slight R scoliotic curve in T-L spine with hypermobility noted with spinal mobility testing. Increased thoracic kyphosis is also contributing to poor, slouched posture. Pt also having general weakness which be from her limited activity lately.  Pt reported h/o hypertension.  Pain symptoms are not improving. Overall, pt is limited with her normal function and hobbies due to his pain. Pt would benefit from PT for stretching and strengthening balance for back musculature, improving posture, and core stability. Functional impairments include standing, bed mobility, meal preparation, ambulating stairs, bending, lifting, dressing, and ADLs.  Pt demo's signs and sx consistent with lumbar sprain, contributory from posture/T-L curve.     Impairments in  pain, posture, ROM, joint mobility, strength  The POC is dynamic and will be modified on an ongoing basis.  Patient will return to clinic if symptoms persist.  Barriers to achieving goals as noted in the assessment section may affect outcome.  Prognosis to achieve goals is  good   Skilled PT is required to improve ROM, flexibility, posture, strength, ADL function, mobility, and reduce pain.  Pt. is appropriate for skilled PT intervention as  outlined in the Plan of Care (POC).  Pt. is a good candidate for skilled PT services to improve pain levels and function.    Goals:  Pt will: be independent with HEP within 3 weeks.  Pt will: be be able to perform bending without pain for gardening within 6 weeks.  Pt will: be able to stand for 20 minutes or more pain free within 8 weeks.  Pt will: decrease TEMITOPE by 6 points or more to demonstrate improved function within 8 weeks.    Patient's expectations/goals are realistic.    Barriers to Learning or Achieving Goals:  No Barriers.       Plan / Patient Instructions:        Plan of Care:   Communication with: Referral Source  Patient Related Instruction: Nature of Condition;Body mechanics;Posture;Treatment plan and rationale;Precautions;Next steps;Self Care instruction;Expected outcome;Basis of treatment  Times per Week: 1-2  Number of Weeks: 4-5  Number of Visits: 8-10  Discharge Planning: Pt will be discharged when all goals are met, lack of progress or worsening condition, MD referral, and/or pt desires to continue with HEP independently.  Precautions / Restrictions : none  Therapeutic Exercise: ROM;Stretching;Strengthening  Neuromuscular Reeducation: kinesio tape;posture;core  Manual Therapy: soft tissue mobilization;myofascial release;joint mobilization;muscle energy;strain counterstrain  Modalities: electrical stimulation;TENS;iontophoresis;ultrasound;cold pack;hot pack  Functional Training (ADL's): self care;meal prep;ADL's;instructions for equipment;ergonomics;instructions in transfers  Equipment: theraband;TENS unit    Plan for next visit: review HEP, postural and core progression, stretches     Subjective:         Social information:   Living Situation:[facility]   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 female who presents to therapy today with complaints of L sided low back pain  "that has been ongoing since her  give her a massage on 12/10/16. \"I felt something give.\" Pt says that she did something a week later that made her pain much worse, but cannot recall what she did. Pt says that it has slowly improved, but occasionally gets increased pain. She denies history of similar symptoms. She describes their previous level of function as not limited.    Pain Ratin  Pain rating at best: 0  Pain rating at worst: 7  Pain description: aching, dull and sharp    Functional limitations are described as occurring with:   ascending and descending stairs or curbs  bending  lifting  personal cares dressing lower body and donning shoes and socks  performing routine daily activities  standing : 15 minute tolerance  meal preparation, carrying item    Patient reports benefit from:  movement or exercise , anti-inflammatory, heat, cold         Objective:      Note: Items left blank indicates the item was not performed or not indicated at the time of the evaluation.    Patient Outcome Measures :    Modified Oswestry Low Back Pain Disablity Questionnaire  in %: 24   Scores range from 0-100%, where a score of 0% represents minimal pain and maximal function. The minimal clinically important difference is a score reduction of 12%.    Examination  1. Acute left-sided low back pain without sciatica     2. Abnormal posture     3. Generalized muscle weakness     4. Muscle tightness       Involved side: Left  Posture Observation:      General sitting posture is  fair.  General standing posture is fair.  Cervical:  Mild forward head  Shoulder/Thoracic complex: Mildly increased mid thoracic kyphosis  Lumbopelvic complex: Mild scoliosis    Lumbar ROM:    Date: 17     *Indicate scale AROM AROM AROM   Lumbar Flexion WFL      Lumbar Extension Min restrction      Right Left Right Left Right Left   Lumbar Sidebending To knee  To knee       Lumbar Rotation WFL  WFL       Thoracic Flexion      Thoracic Extension    "   Thoracic Sidebending         Thoracic Rotation           Lower Extremity Strength:     Date: 2/1/17     LE strength/5 Right Left Right Left Right Left   Hip Flexion (L1-3) 4+ 4+       Hip Extension (L5-S1) 4+ 4+       Hip Abduction (L4-5) 4+ 4+       Hip Adduction (L2-3) 5- 5-       Hip External Rotation         Hip Internal Rotation         Knee Extension (L3-4) 5 5       Knee Flexion 4+ 4+       Ankle Dorsiflexion (L4-5) 5 5       Great Toe Extension (L5)         Ankle Plantar flexion (S1)         Abdominals        Sensation: not impaired    Palpation: mild tenderness with mobility testing of lumbar spine    Lumbar Special Tests:     Lumbar Special Tests Right Left SI Tests Right  Left   Quadrant test - - SI Compression - -   Straight leg raise - - SI Distraction     Crossover response   POSH Test     Slump - - Sacral Thrust     Sit-up test  FADIR     Trunk extensor endurance test  Resisted Abduction     Prone instability test  Other:     Pubic shotgun  Other:       Repeated Motion Testing:  Does not centralize  Does not peripheralize    Passive Mobility - Joint Integrity:  Hypermobile    LE Screen:  hyper-flexibile, mild scoliosis to R (thoraco-lumbar)    Treatment Today     TREATMENT MINUTES COMMENTS   Evaluation 25    Self-care/ Home management     Manual therapy     Neuromuscular Re-education     Therapeutic Activity     Therapeutic Exercises 25 See exercises. Pt educated on how curve in the spine may contribute to weakness and muscle tightness. Pt educated on the importance of HEP as well.   Gait training     Modality__________________                Total 50    Blank areas are intentional and mean the treatment did not include these items.       PT Evaluation Code: (Please list factors)  Patient History/Comorbidities: hypertension  Examination: poor posture, weakness, LBP, back tightness  Clinical Presentation: stable  Clinical Decision Making: low    Patient History/  Comorbidities Examination  (body  structures and functions, activity limitations, and/or participation restrictions) Clinical Presentation Clinical Decision Making (Complexity)   No documented Comorbidities or personal factors 1-2 Elements Stable and/or uncomplicated Low   1-2 documented comorbidities or personal factor 3 Elements Evolving clinical presentation with changing characteristics Moderate   3-4 documented comorbidities or personal factors 4 or more Unstable and unpredictable High              Jaziel Spear, PT, DPT  2/1/2017  2:13 PM

## 2021-06-08 NOTE — TELEPHONE ENCOUNTER
Refill Approved    Rx renewed per Medication Renewal Policy. Medication was last renewed on 10/14/19.    Maira Martinez, [facility] Triage/Med Refill 6/16/2020     Requested Prescriptions   Pending Prescriptions Disp Refills     gabapentin (NEURONTIN) 100 MG capsule [Pharmacy Med Name: GABAPENTIN 100 MG CAPSULE] 60 capsule 3     Sig: TAKE 1 TO 3 CAPSULES BY MOUTH DAILY AT BEDTIME       Gabapentin/Levetiracetam/Tiagabine Refill Protocol  Passed - 6/15/2020 10:50 AM        Passed - PCP or prescribing provider visit in past 12 months or next 3 months     Last office visit with prescriber/PCP: 8/13/2019 Ale Thomson CNP OR same dept: 8/13/2019 Ale Thomson CNP OR same specialty: 8/13/2019 Ale Thomson CNP  Last physical: 6/3/2019 Last MTM visit: Visit date not found   Next visit within 3 mo: Visit date not found  Next physical within 3 mo: Visit date not found  Prescriber OR PCP: Ale Thomson CNP  Last diagnosis associated with med order: 1. Menopausal syndrome (hot flashes)  - gabapentin (NEURONTIN) 100 MG capsule [Pharmacy Med Name: GABAPENTIN 100 MG CAPSULE]; TAKE 1 TO 3 CAPSULES BY MOUTH DAILY AT BEDTIME  Dispense: 60 capsule; Refill: 3    If protocol passes may refill for 12 months if within 3 months of last provider visit (or a total of 15 months).

## 2021-06-08 NOTE — PROGRESS NOTES
Assessment/Plan:        Diagnoses and all orders for this visit:    Hematuria  -     Cancel: Urinalysis-UC if Indicated  -     Urinalysis-UC if Indicated; Future    Lumbar sprain, initial encounter  -     Ambulatory referral to Physical Therapy    Visit for screening mammogram  -     Mammo Screening Bilateral; Future; Expected date: 1/27/17    Other orders  -     lisinopril-hydrochlorothiazide (PRINZIDE,ZESTORETIC) 10-12.5 mg per tablet; Take 1 tablet by mouth daily.  Dispense: 90 tablet; Refill: 3    Will treat as lumbar sprain. Recommended ice to area. Avoidance of lifting as possible.  Will refer to PT for further treatment and exercises.   She is going to return to give us a UA- she has had small blood in her urine in the past         Subjective:    Patient ID: Maribell Cabrera is a 51 y.o. female.    Back Pain   This is a new problem. The current episode started more than 1 month ago. The problem occurs daily. The problem has been waxing and waning since onset. The pain is present in the lumbar spine. The quality of the pain is described as aching, burning, cramping and shooting. Radiates to: had radiated down her leg but now is only in back  The pain is at a severity of 5/10. The pain is moderate. The pain is the same all the time. The symptoms are aggravated by bending, position and sitting. Stiffness is present all day. Pertinent negatives include no bladder incontinence, bowel incontinence, leg pain, numbness, paresthesias or tingling. 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 The treatment provided mild relief.       The following portions of the patient's history were reviewed and updated as appropriate: allergies, current medications, past family history, past medical history, past social history, past surgical history and problem list.    Review of Systems   Constitutional: Positive for activity change.    Gastrointestinal: Negative for bowel incontinence.   Genitourinary: Negative for bladder incontinence.   Musculoskeletal: Positive for back pain and myalgias.   Neurological: Negative for tingling, numbness and paresthesias.   All other systems reviewed and are negative.            Objective:    Physical Exam   Constitutional: She appears well-developed and well-nourished. No distress.   Musculoskeletal: She exhibits no tenderness or deformity.   Exam of her back shows the indicated area is in the L upper lumbar region. No pain to palpation over this area. No pain over the vertebrae. ROM - Forward flexion is intact without pain. She has pain on coming back up to standing. Extension is without pain. Flexion to R and L are intact with mild pain. She has intact sensation. DTR are = bilaterally in the LE.    Skin: Skin is warm and dry.   Nursing note and vitals reviewed.

## 2021-06-08 NOTE — PROGRESS NOTES
"[facility] Daily Progress     Patient Name: Maribell Cabrera  Date: 2017  Visit #: 2  PTA visit #:    Referral Diagnosis: 847.2 (ICD-9-CM) - S33.5XXA (ICD-10-CM) - Lumbar sprain, initial encounter  Referring provider: Deborah Jenkins*  Visit Diagnosis:     ICD-10-CM    1. Acute left-sided low back pain without sciatica M54.5    2. Abnormal posture R29.3    3. Generalized muscle weakness M62.81    4. Muscle tightness M62.89          Assessment:     Patient is benefitting from skilled physical therapy and is making steady progress toward functional goals.  Patient is appropriate to continue with skilled physical therapy intervention, as indicated by initial plan of care.    Pt having less discomfort since first session. Pt is tolerating all exercises well and showing improved core control. Pt continues to have occasional R groin pain which may be coming from lumbar region, but not present during session.    Goal Status:  Pt will: be independent with HEP within 3 weeks. (In progress)  Pt will: be be able to perform bending without pain for gardening within 6 weeks. (In progress)  Pt will: be able to stand for 20 minutes or more pain free within 8 weeks. (In progress)  Pt will: decrease TEMITOPE by 6 points or more to demonstrate improved function within 8 weeks. (In progress)    Plan / Patient Education:     Continue with initial plan of care.  Progress with home program as tolerated.     Next session: core progression (UUDD, bicycle), trial planks, quadruped exercises    Subjective:     Pain Ratin  Pt shares that her pain is somewhat better. \"I occasionally get a twinge here and there, but better overall.\" Pt has no complaints today.      Objective:     Good pace with TA march, maintaining TA contraction    B quad tightness, more on L    Treatment Today     TREATMENT MINUTES COMMENTS   Evaluation     Self-care/ Home management     Manual therapy     Neuromuscular Re-education     Therapeutic " Activity     Therapeutic Exercises 28 See exercises. Added TA march and hip flexor stretch to HEP.   Gait training     Modality__________________                Total 28    Blank areas are intentional and mean the treatment did not include these items.       Jaziel Spear, PT, DPT  2/8/2017

## 2021-06-09 NOTE — PATIENT INSTRUCTIONS - HE
Your rapid strep test is negative    Treat diarrhea with the normal low residue diet  Plenty of liquids  Easy to digest foods  Avoid dairy    Blood work has been put in to screen you for tickborne illnesses as well as COVID virus    I would like you to do a covid-19 test in 1 month's time    I would like you to consider doing doxycycline

## 2021-06-09 NOTE — PROGRESS NOTES
"ASSESSMENT & PLAN    Nonintractable headache, unspecified chronicity pattern, unspecified headache type  2 days   Suddenly Late PM 5 PM  Nausea No  Vomiting No   Photo or PhonoNo    Like a \" comes and goes\"  Minor  Taste \"nothing taste good\"      Diarrhea, unspecified type  1 day  \"not bad\"  Not uncontrollable  No blood  No dark  No exposure  No Cough     ++ Cabin   2011 tick   Rash 1 cm bright 2 weeks     [facility] Marlon Reyna was seen today for fatigue, diarrhea and migraine.    Diagnoses and all orders for this visit:    Seasonal affective disorder (H)    Poor appetite  -     Rapid Strep A Screen-Throat  -     Babesia microti Antibody IgG; Future  -     Ehrlichia chaffeensis Antibodies, IgG & IgM by IFA; Future  -     Lyme Disease Antibody Confirmation; Future  -     Group A Strep, RNA Direct Detection, Throat  -     Babesia microti Antibody IgG  -     Ehrlichia chaffeensis Antibodies, IgG & IgM by IFA  -     Lyme Disease Antibody Confirmation    Nonintractable headache, unspecified chronicity pattern, unspecified headache type  -     Babesia microti Antibody IgG; Future  -     Ehrlichia chaffeensis Antibodies, IgG & IgM by IFA; Future  -     Lyme Disease Antibody Confirmation; Future  -     Babesia microti Antibody IgG  -     Ehrlichia chaffeensis Antibodies, IgG & IgM by IFA  -     Lyme Disease Antibody Confirmation    Diarrhea, unspecified type  -     Babesia microti Antibody IgG; Future  -     Ehrlichia chaffeensis Antibodies, IgG & IgM by IFA; Future  -     Lyme Disease Antibody Confirmation; Future  -     Babesia microti Antibody IgG  -     Ehrlichia chaffeensis Antibodies, IgG & IgM by IFA  -     Lyme Disease Antibody Confirmation    Pharyngitis, unspecified etiology  -     Rapid Strep A Screen-Throat  -     Group A Strep, RNA Direct Detection, Throat    Exposure to potential infection  -     COVID-19 Virus (Coronavirus) Antibody & Titer Reflex; Future  -     COVID-19 Virus (Coronavirus) Antibody & " Titer Reflex        Patient Instructions   Your rapid strep test is negative    Treat diarrhea with the normal low residue diet  Plenty of liquids  Easy to digest foods  Avoid dairy    Blood work has been put in to screen you for tickborne illnesses as well as COVID virus    I would like you to do a covid-19 test in 1 month's time    I would like you to consider doing doxycycline      Return in about 1 month (around 8/20/2020) for unless symptoms worsen despite plan of care.       Little interest or pleasure in doing things: Not at all  Feeling down, depressed, or hopeless: Not at all    CHIEF COMPLAINT: Maribell Cabrera had concerns including Fatigue (x1 weeek ); Diarrhea; and Migraine.    [place]: 1.............. SUBJECTIVE:  Maribell Cabrera is a 54 y.o. female had concerns including Fatigue (x1 weeek ); Diarrhea; and Migraine.    1. Seasonal affective disorder (H)    2. Poor appetite    3. Nonintractable headache, unspecified chronicity pattern, unspecified headache type    4. Diarrhea, unspecified type    5. Pharyngitis, unspecified etiology    6. Exposure to potential infection          No Known Allergies                      SOCIAL: She  reports that she has never smoked. She has never used smokeless tobacco. She reports current alcohol use. She reports that she does not use drugs.    REVIEW OF SYSTEMS:   Family history not pertinent to chief complaint or presenting problem    Review of systems otherwise negative as requested from patient, except   Those positive ROS outlined and discussed in [place].      VITALS:  Vitals:    07/20/20 1042   BP: 126/72   Pulse: (!) 56   SpO2: 99%   Weight: 128 lb (58.1 kg)     Wt Readings from Last 3 Encounters:   07/20/20 128 lb (58.1 kg)   08/13/19 123 lb (55.8 kg)   06/03/19 123 lb (55.8 kg)     Body mass index is 21.63 kg/m .    Physical Exam:  Sclera are clear  Nasal mucosa is clear  Oropharynx slight hyperemia  No cervical or supraclavicular nodes  TMs are clear  Lungs are  clear  Cardiac no murmur regular rate rhythm  No lower extremity edema    Recent Results (from the past 240 hour(s))   Rapid Strep A Screen-Throat    Specimen: Throat   Result Value Ref Range    Rapid Strep A Antigen No Group A Strep detected, presumptive negative No Group A Strep detected, presumptive negative        I spent 25  minutes with this patient face to face, of which 50% or greater was spent in counseling and coordination of care with regards to Maribell was seen today for fatigue, diarrhea and migraine.    Diagnoses and all orders for this visit:    Seasonal affective disorder (H)    Poor appetite  -     Rapid Strep A Screen-Throat  -     Babesia microti Antibody IgG; Future  -     Ehrlichia chaffeensis Antibodies, IgG & IgM by IFA; Future  -     Lyme Disease Antibody Confirmation; Future  -     Group A Strep, RNA Direct Detection, Throat  -     Babesia microti Antibody IgG  -     Ehrlichia chaffeensis Antibodies, IgG & IgM by IFA  -     Lyme Disease Antibody Confirmation    Nonintractable headache, unspecified chronicity pattern, unspecified headache type  -     Babesia microti Antibody IgG; Future  -     Ehrlichia chaffeensis Antibodies, IgG & IgM by IFA; Future  -     Lyme Disease Antibody Confirmation; Future  -     Babesia microti Antibody IgG  -     Ehrlichia chaffeensis Antibodies, IgG & IgM by IFA  -     Lyme Disease Antibody Confirmation    Diarrhea, unspecified type  -     Babesia microti Antibody IgG; Future  -     Ehrlichia chaffeensis Antibodies, IgG & IgM by IFA; Future  -     Lyme Disease Antibody Confirmation; Future  -     Babesia microti Antibody IgG  -     Ehrlichia chaffeensis Antibodies, IgG & IgM by IFA  -     Lyme Disease Antibody Confirmation    Pharyngitis, unspecified etiology  -     Rapid Strep A Screen-Throat  -     Group A Strep, RNA Direct Detection, Throat    Exposure to potential infection  -     COVID-19 Virus (Coronavirus) Antibody & Titer Reflex; Future  -     COVID-19  Virus (Coronavirus) Antibody & Titer Reflex        Bean Singh MD  [facility] [id number]  [phone]

## 2021-06-09 NOTE — TELEPHONE ENCOUNTER
Test Results  Who is calling?:  Patient  Who ordered the test:    Bean Singh MD  Type of test: Lab  Date of test:  7/20/2020  Where was the test performed:    [facility]  What are your questions/concerns?:    Patient is requesting the Lyme Disease Antibody test results of.  Please reach out to patient and advise.  Okay to leave a detailed message?:  Yes

## 2021-06-10 NOTE — TELEPHONE ENCOUNTER
Recent Results (from the past 240 hour(s))   Rapid Strep A Screen-Throat    Specimen: Throat   Result Value Ref Range    Rapid Strep A Antigen No Group A Strep detected, presumptive negative No Group A Strep detected, presumptive negative   Group A Strep, RNA Direct Detection, Throat    Specimen: Throat   Result Value Ref Range    Group A Strep by PCR No Group A Strep rRNA detected No Group A Strep rRNA detected   Babesia microti Antibody IgG   Result Value Ref Range    Babesia microti IgG < 1:16 <1:16   Ehrlichia chaffeensis Antibodies, IgG & IgM by IFA   Result Value Ref Range    Ehrlichia chaffeensis Antibody, IgG <1:64 <1:64    Ehrlichia chaffeensis Antibody, IgM < 1:16 <1:16   Lyme Disease Antibody Confirmation   Result Value Ref Range    Lyme Ab, IgG Blot Negative Negative    Lyme Ab, IgG Band(s) No Bands Detected     Lyme Ab, IgM Blot  Negative Negative    Lyme Ab, IgM Band(s) No Bands Detected     Lyme Immuno Blot Interpretation     COVID-19 Virus (Coronavirus) Antibody & Titer Reflex   Result Value Ref Range    COVID-19 Antibody Screen Negative     COVID-19 IgG Titer Not Applicable          Yes these are all OK  Normal  DanL

## 2021-06-11 NOTE — PROGRESS NOTES
[facility] Discharge Summary  Patient Name: Maribell Cabrera  Date: 6/13/2017  Referral Diagnosis: 847.2 (ICD-9-CM) - S33.5XXA (ICD-10-CM) - Lumbar sprain, initial encounter  Referring provider: Deborah Jenkins*  Visit Diagnosis:   1. Acute left-sided low back pain without sciatica     2. Abnormal posture     3. Generalized muscle weakness     4. Muscle tightness         Goals:  Pt will: be independent with HEP within 3 weeks. (Goal Met)  Pt will: be be able to perform bending without pain for gardening within 6 weeks. (Improving.)  Pt will: be able to stand for 20 minutes or more pain free within 8 weeks. (Not Met)  Pt will: decrease TEMITOPE by 6 points or more to demonstrate improved function within 8 weeks. (Unable to determine.)    Patient was seen for 2 visits from 2/1/17 to 2/8/17.   The patient attended therapy initially, but did not finish the therapy sessions prescribed.  Goals were not fully achieved. Explanation for goals not achieved: pt did not return to PT.  Patient received a home program   The patient discontinued therapy, did not return.  No further therapy is required at this time.    Therapy will be discontinued at this time.  The patient will need a new referral to resume.    Thank you for your referral.  Jaziel Spear, PT, DPT  6/13/2017  12:33 PM

## 2021-06-13 NOTE — TELEPHONE ENCOUNTER
Maribell is calling and feels that she has an UTI as she is having urgency and frequency and painful urination, slight.  Maribell is also starting to feel weakness.    Maribell  is requesting an order for a urine specimen be placed and Maribell will go to the [facility] if this is possible.   Please phone Maribell.      COVID 19 Nurse Triage Plan/Patient Instructions    Please be aware that novel coronavirus (COVID-19) may be circulating in the community. If you develop symptoms such as fever, cough, or SOB or if you have concerns about the presence of another infection including coronavirus (COVID-19), please contact your health care provider or visit [email or web address].     Disposition/Instructions    In-Person Visit with provider recommended. Reference Visit Selection Guide.    Thank you for taking steps to prevent the spread of this virus.  o Limit your contact with others.  o Wear a simple mask to cover your cough.  o Wash your hands well and often.    Resources    [facility]: About COVID-19: [email or web address]    CDC: What to Do If You're Sick: www.cdc.gov/coronavirus/2019-ncov/about/steps-when-sick.html    CDC: Ending Home Isolation: www.cdc.gov/coronavirus/2019-ncov/hcp/disposition-in-home-patients.html     CDC: Caring for Someone: www.cdc.gov/coronavirus/2019-ncov/if-you-are-sick/care-for-someone.html     [facility]: Interim Guidance for Hospital Discharge to Home: www.health.[facility].mn.us/diseases/coronavirus/hcp/hospdischarge.pdf    [facility] clinical trials (COVID-19 research studies): [email or web address].[email or web address]/umn-clinical-trials     Below are the COVID-19 hotlines at the [facility] ([facility]). Interpreters are available.   o For health questions: Call 876-127-6470 or 1-500.657.5482 (7 a.m. to 7 p.m.)  o For questions about schools and childcare: Call 598-608-0140 or 1-857.674.5172 (7 a.m. to 7 p.m.)       Reason for Disposition    Age > 50 years    Additional Information     Negative: Shock suspected (e.g., cold/pale/clammy skin, too weak to stand, low BP, rapid pulse)    Negative: Sounds like a life-threatening emergency to the triager    Negative: Severe pain with urination    Negative: Fever > 100.5 F (38.1 C)    Negative: Side (flank) or lower back pain present    Negative: Unable to urinate (or only a few drops) and bladder feels very full    Negative: Vomiting    Negative: Patient sounds very sick or weak to the triager    Negative: Taking antibiotic > 24 hours for UTI and fever persists    Negative: Taking antibiotic > 3 days for UTI and painful urination not improved    Negative: Unusual vaginal discharge    Negative: > 2 UTIs in last year    Negative: Patient is worried about sexually transmitted disease (STD)    Protocols used: URINATION PAIN - FEMALE-A-OH

## 2021-06-15 NOTE — TELEPHONE ENCOUNTER
Refill Approved    Rx renewed per Medication Renewal Policy. Medication was last renewed on 6/16/2020.    Viviane Kendrick, [facility] Connection Triage/Med Refill 2/3/2021     Requested Prescriptions   Pending Prescriptions Disp Refills     gabapentin (NEURONTIN) 100 MG capsule [Pharmacy Med Name: GABAPENTIN 100 MG CAPSULE] 90 capsule 2     Sig: TAKE 1 TO 3 CAPSULES BY MOUTH DAILY AT BEDTIME       Gabapentin/Levetiracetam/Tiagabine Refill Protocol  Passed - 2/3/2021  1:48 PM        Passed - PCP or prescribing provider visit in past 12 months or next 3 months     Last office visit with prescriber/PCP: 8/13/2019 Ale Thomson CNP OR same dept: Visit date not found OR same specialty: 7/20/2020 Bean Singh MD  Last physical: 6/3/2019 Last MTM visit: Visit date not found   Next visit within 3 mo: Visit date not found  Next physical within 3 mo: Visit date not found  Prescriber OR PCP: Ale Thomson CNP  Last diagnosis associated with med order: 1. Menopausal syndrome (hot flashes)  - gabapentin (NEURONTIN) 100 MG capsule [Pharmacy Med Name: GABAPENTIN 100 MG CAPSULE]; TAKE 1 TO 3 CAPSULES BY MOUTH DAILY AT BEDTIME  Dispense: 90 capsule; Refill: 2    If protocol passes may refill for 12 months if within 3 months of last provider visit (or a total of 15 months).

## 2021-06-16 PROBLEM — R51.9 NONINTRACTABLE HEADACHE, UNSPECIFIED CHRONICITY PATTERN, UNSPECIFIED HEADACHE TYPE: Status: ACTIVE | Noted: 2020-07-20

## 2021-06-16 NOTE — TELEPHONE ENCOUNTER
----- Message from Ale Thomson CNP sent at 4/19/2021  5:05 PM CDT -----  Please call patient and let her know that her blood count and marker for blood clots are both normal.  We will have her metabolic panel back tomorrow but at this point everything looks reassuring

## 2021-06-16 NOTE — PROGRESS NOTES
Assessment & Plan   1. Shortness of breath  Experienced several possible side effects following COVID vaccine though persistent sensation of dyspnea as well as burning sensation in the chest.  EKG without change from previous. Discussed rare risk of thrombosis though will check blood count, D-dimer and follow up with imaging if any concerns.  Encouraged immediate ED visit if she experiences worsening dyspnea or neurologic change.s    - Electrocardiogram Perform and Read  - D-dimer, Quantitative  - HM2(CBC w/o Differential)    2. Benign Essential Hypertension  Well controlled on lisinopril-hydrochlorothiazide.  Check labs.    - Comprehensive Metabolic Panel    3. Seasonal affective disorder (H)  Some anxiety this year with COVID though overall feels she has been coping well.     Ale Thomson, CNP    Subjective   Chief Complaint:  vaccine effects (JJ 04/01/2021)    HPI:   Maribell Cabrera is a 55 y.o. female who presents for possible vaccine reaction    She is here today to discuss possible vaccine reaction.  States she received the J&J vaccine on 4/1.  Symptoms began 4-5 days after vaccine.  Describes intermittent nausea, weakness in bilateral lower extremities, pressure on chest, breathing changes (more shallow), indigestion - heartburn.  The change in breathing pattern to feel a little more labored and shallow seems persistent.  Everything else intermittent. She has also noted some persistent burning/discomfort in the chest that feels consistent with indigestion which is odd for her.  She has been unable to go for walks as she is worried that her breathing will become even worse. Feels she doesn't have the stamina that she typically does.      No LE edema, no severe HA. Denies chest pain.  No bleeding or easy bruising.      FMH TIAs in dad, PGM.  MI in MGF, PGF    HTN:  Continues on lisinopril-hydrochlorothiazide. Blood pressure just at goal today.  Due for labs    Allergies:  has No Known Allergies.    SH/FH:   Social History and Family History reviewed and updated.   Tobacco Status:  She  reports that she has never smoked. She has never used smokeless tobacco.    Review of Systems:  A complete head to toe ROS is negative unless otherwise noted in HPI    Objective     Vitals:    04/19/21 1332   BP: 132/64   Patient Site: Left Arm   Patient Position: Sitting   Cuff Size: Adult Regular   Pulse: (!) 51   Temp: 98.5  F (36.9  C)   TempSrc: Tympanic   SpO2: 100%   Weight: 130 lb 8 oz (59.2 kg)       Physical Exam:  GENERAL: Alert, well-appearing  .   PSYCH: Pleasant mood, affect appropriate.    SKIN: No ecchymosis  HEAD: Normocephalic, atraumatic  EYES: Conjunctiva pink, sclera white, no exudates. MAXIMUS.  EOMs intact.  CV: Regular rate and rhythm without murmurs, rubs or gallops.  RESP: Lung sounds clear  ABDOMEN: BS+. Abdomen soft, nontender to palpation without guarding. No organomegaly, masses or hernias  PV : No edema  NEURO: CNs 2-12 intact. DTRs 2/4. Normal motor and sensory

## 2021-06-16 NOTE — PROGRESS NOTES
FEMALE ADULT PREVENTIVE EXAM    CHIEF COMPLAINT:  Female preventive exam.    SUBJECTIVE:  Maribell Cabrera is a 52 y.o. female who presents for her routine physical exam.    Patient would like to address the following concerns today: She is having some palpitations. These can be daily or every several day. No SOB, No chst pain with   These. No  Lightheadedness. She is concerned because of the family hx of heart disease.       GYNE HISTORY  Menses: no  Sexually Active:   yes  Contraception:   no  Last Pap:  12/14  Abnormal Pap:  no  Vaginal Discharge:  no      She  has no past medical history on file.    Lab Results   Component Value Date    WBC 7.9 10/05/2016    HGB 13.7 10/05/2016    HCT 40.4 10/05/2016    MCV 86 10/05/2016     10/05/2016     10/05/2016    K 3.9 10/05/2016    BUN 14 10/05/2016     Lab Results   Component Value Date    CHOL 252 (H) 03/21/2016    HDL 79 03/21/2016    LDLCALC 159 (H) 03/21/2016    TRIG 70 03/21/2016     Lab Results   Component Value Date    TSH 1.36 10/05/2016     BP Readings from Last 3 Encounters:   02/20/18 120/62   01/19/18 148/70   01/27/17 148/70       Surgeries:  No past surgical history on file.    Family History:  Her family history includes Breast cancer (age of onset: 50) in her mother; Breast cancer (age of onset: 54) in her sister; Cancer in her father; Coronary artery disease in her paternal grandfather; Diabetes in her father; Heart attack in her maternal grandfather; Hypertension in her father; Melanoma in her father; Stroke in her paternal grandmother.    Social History:  She  reports that she has never smoked. She does not have any smokeless tobacco history on file. She reports that she drinks alcohol. She reports that she does not use illicit drugs.    Medications:    Current Outpatient Prescriptions:      cholecalciferol, vitamin D3, 1,000 unit tablet, Take 2,000 Units by mouth daily. , Disp: , Rfl:      lisinopril-hydrochlorothiazide  (PRINZIDE,ZESTORETIC) 10-12.5 mg per tablet, TAKE 1 TABLET DAILY, Disp: 90 tablet, Rfl: 3     loratadine (CLARITIN) 10 mg tablet, Take 10 mg by mouth daily., Disp: , Rfl:      multivitamin therapeutic (THERAGRAN) tablet, , Disp: , Rfl:   HELD MEDICATIONS: None.    Allergies:  No latex allergies.  No Known Allergies         RISK BEHAVIOR & HEALTH HABITS  Self Breast Exam:  yes  Regular Exercise:  yes  Balanced diet:  yes  Seat Belt Use: yes  Calcium intake/Osteoporosis prevention:  Discussed calcium and Vitamin D recommendations  Dexa:  no  Colonoscopy: 6/16  Mammogram:  3/17    Guns: NO  Sun Screen: YES  Dental Care: YES    REVIEW OF SYSTEMS:  Complete head to toe review of systems is otherwise negative except as above.    OBJECTIVE:  VITAL SIGNS:    Vitals:    02/20/18 0959   BP: 120/62     GENERAL:  Patient alert, in no acute distress.  EYES: PERRLA. Extraoccular movements intact, pupils equal, reactive to light and accommodation.  ENT:  Hearing grossly normal.  Normal appearance to ears and nose.  Bilateral TM s, external canals, oropharynx normal. Normal lips, gums and teeth.  Normal nasal mucosa, septum and turbinates.  NECK:  Supple, without thyromegaly or mass.  RESP:  Clear to auscultation without crackles, wheezes or distress.  Normal respiratory effort.   CV:  Regular rate and rhythm without murmurs, rubs or gallops.  Normal carotid, abdominal aorta, femoral and pedal pulses.  No varicosities or edema.  ABDOMEN:  Soft, non-tender, without hepatosplenomegaly, masses, or hernias.   BREASTS:  Nontender, without masses, nipple discharge, erythema, or axillary adenopathy.    :    External genitalia:  Normal without lesions.  Urethra: Normal appearing  Vagina: Normal without discharge  Cervix: Cervix palpably normal. No tenderness on cervical motion.  Uterus:  Nontender, mobile, without masses  Ovaries: Normal without masses  LYMPHATIC: Normal palpation of neck, groin and axilla..  No lymphadenopathy.  No  bruising.  NEURO:  Non-focal and intact.  PSYCHIATRIC:  Alert & oriented with normal mood and affect.  Good judgment and insight.  SKIN:  Normal inspection and palpation.  MUSCULOSKELETAL: Normal gait and station.      ASSESSMENT & PLAN  Maribell was seen today for annual exam.    Diagnoses and all orders for this visit:    Palpitations  -     HM1(CBC and Differential)  -     Comprehensive Metabolic Panel  -     One-Lead Patch Monitor; Future  -     HM1 (CBC with Diff)    Screening for hyperlipidemia  -     Lipid Cascade    Vitamin D deficiency  -     Vitamin D, Total (25-Hydroxy)      General  Immunizations reviewed and updated .  Recommended adequate calcium intake/osteoporosis prevention.  Discussed colon cancer screening at age 50, 45 if -American.  Diet and exercise reviewed,

## 2021-06-16 NOTE — TELEPHONE ENCOUNTER
Left message to call back for: Patient  Information to relay to patient:  Per Ale- let her know that her blood count and marker for blood clots are both normal.  We will have her metabolic panel back tomorrow but at this point everything looks reassuring.    SAÚL/SANCHEZ

## 2021-06-17 NOTE — TELEPHONE ENCOUNTER
Refill Approved    Rx renewed per Medication Renewal Policy. Medication was last renewed on 2/3/21.    Rah Horvath, [facility] Triage/Med Refill 5/2/2021     Requested Prescriptions   Pending Prescriptions Disp Refills     gabapentin (NEURONTIN) 100 MG capsule [Pharmacy Med Name: GABAPENTIN 100 MG CAPSULE] 90 capsule 1     Sig: TAKE 1 TO 3 CAPSULES BY MOUTH DAILY AT BEDTIME       Gabapentin/Levetiracetam/Tiagabine Refill Protocol  Passed - 4/30/2021  7:47 AM        Passed - PCP or prescribing provider visit in past 12 months or next 3 months     Last office visit with prescriber/PCP: 4/19/2021 Ale Thomson CNP OR same dept: Visit date not found OR same specialty: 4/19/2021 Ale Thomson CNP  Last physical: 6/3/2019 Last MTM visit: Visit date not found   Next visit within 3 mo: Visit date not found  Next physical within 3 mo: Visit date not found  Prescriber OR PCP: Ale Thomson CNP  Last diagnosis associated with med order: 1. Menopausal syndrome (hot flashes)  - gabapentin (NEURONTIN) 100 MG capsule [Pharmacy Med Name: GABAPENTIN 100 MG CAPSULE]; TAKE 1 TO 3 CAPSULES BY MOUTH DAILY AT BEDTIME  Dispense: 90 capsule; Refill: 1    If protocol passes may refill for 12 months if within 3 months of last provider visit (or a total of 15 months).

## 2021-06-19 NOTE — PROGRESS NOTES
Subjective:      Patient ID: Maribell Cabrera is a 52 y.o. female.    Chief Complaint:   Chief Complaint   Patient presents with     painful urination, frequency, urgency,         HPI: Fatigue and weakness.  Describes this as specifically body weakness rather than feeling tired like she has not slept.  This happened rather suddenly and specifically was not progressive.  Does have a cabin and has vacationed in [place] and [place] recently  and saw a deer tick on her recently, but no embedded ticks removed.  Has had Lymes twice in the past, 2011 and 2017.      Mild vaginal irritation with wiping but no dysuria.  Rash on chest which she thinks is sunburn.  No fever, chills.  Has not changed her diet lately and has been eating and drinking normally.  Has been drinking a lot of water specifically.    Says this does feel like previous Lyme's disease only without the rash.        No past medical history on file.    Past Surgical History:   Procedure Laterality Date     MOUTH SURGERY         Family History   Problem Relation Age of Onset     Breast cancer Mother 50     Melanoma Father      @79     Cancer Father      gastro stromal tumor     Diabetes Father      Hypertension Father      Heart attack Maternal Grandfather      Stroke Paternal Grandmother      Coronary artery disease Paternal Grandfather      Breast cancer Sister 54     Heart disease Maternal Uncle      Arthritis Sister      Diverticulosis Sister      KIRSTIE disease Sister        Social History   Substance Use Topics     Smoking status: Never Smoker     Smokeless tobacco: Never Used     Alcohol use Yes      Comment: Not much!       Review of Systems   Constitutional: Positive for appetite change (Decreased ), fatigue (All over ) and fever (Subjective, nml temps unsure.  ).   Respiratory: Negative for cough.    Gastrointestinal: Negative for abdominal pain, diarrhea, nausea and vomiting.   Endocrine: Positive for polyuria (drinking a lot of water ).    Genitourinary: Negative for dysuria and vaginal discharge (+mild irritation).   Musculoskeletal: Positive for arthralgias (chronic tendinitis ). Negative for back pain, joint swelling, myalgias, neck pain and neck stiffness.   Neurological: Positive for light-headedness. Negative for dizziness and headaches.     Recent Results (from the past 24 hour(s))   Urinalysis-UC if Indicated   Result Value Ref Range    Color, UA Yellow Colorless, Yellow, Straw, Light Yellow    Clarity, UA Clear Clear    Glucose, UA Negative Negative    Bilirubin, UA Negative Negative    Ketones, UA Negative Negative    Specific Gravity, UA 1.015 1.005 - 1.030    Blood, UA Negative Negative    pH, UA 7.5 5.0 - 8.0    Protein, UA Negative Negative mg/dL    Urobilinogen, UA 0.2 E.U./dL 0.2 E.U./dL, 1.0 E.U./dL    Nitrite, UA Negative Negative    Leukocytes, UA Negative Negative   Rapid Strep A Screen-Throat   Result Value Ref Range    Rapid Strep A Antigen No Group A Strep detected, presumptive negative No Group A Strep detected, presumptive negative   HM2(CBC w/o Differential)   Result Value Ref Range    WBC 5.4 4.0 - 11.0 thou/uL    RBC 4.86 3.80 - 5.40 mill/uL    Hemoglobin 14.5 12.0 - 16.0 g/dL    Hematocrit 43.3 35.0 - 47.0 %    MCV 89 80 - 100 fL    MCH 29.7 27.0 - 34.0 pg    MCHC 33.4 32.0 - 36.0 g/dL    RDW 11.5 11.0 - 14.5 %    Platelets 269 140 - 440 thou/uL    MPV 8.7 7.0 - 10.0 fL     No results found.    Objective:     /80 (Patient Site: Right Arm, Patient Position: Sitting, Cuff Size: Adult Regular)  Pulse 60  Temp 98.9  F (37.2  C) (Oral)   Resp 16  Wt 124 lb 6.4 oz (56.4 kg)  LMP 11/08/2014  SpO2 99%  BMI 20.39 kg/m2    Physical Exam   Constitutional: She is oriented to person, place, and time. She appears well-developed and well-nourished.   Cardiovascular: Normal rate, regular rhythm and intact distal pulses.    No murmur heard.  Pulmonary/Chest: Effort normal and breath sounds normal.   Abdominal: Soft.    Musculoskeletal: Normal range of motion.   Neurological: She is alert and oriented to person, place, and time.   Skin: Skin is warm and dry. No rash noted.   Psychiatric: She has a normal mood and affect. Her behavior is normal. Judgment and thought content normal.            Assessment:     Procedures    The primary encounter diagnosis was Generalized muscle weakness. A diagnosis of Painful urination was also pertinent to this visit.    Plan:     Diagnoses and all orders for this visit:    Generalized muscle weakness  -     Rapid Strep A Screen-Throat  -     Lyme Disease Antibody, Total  -     Ehrlichia and Anaplasma Species by Real-Time PCR  -     HM2(CBC w/o Differential)  -     Babesia microti Antibody IgG  -     Group A Strep, RNA Direct Detection, Throat    Painful urination  -     Urinalysis-UC if Indicated        We will check antibodies for Lyme's disease.  However, patient does have a history of Lyme's disease in the past, last in 2017.  This may make as her antibodies to be falsely elevated.  Will check Western blot for confirmation if she does have a positive Lyme screening.      CBC is normal, so felt comfortable sending patient home without further workup.  Patient has nothing in her review of systems that would make me suspect a electrolyte imbalance causing weakness.    Sudden weakness could be due to heart disease, but patient does not have any other associated symptoms with this sensation and it is rather constant.      Patient will be on vacation for 3 days starting tomorrow.  Patient informed no problem if she waits to start a Lyme's treatment if that is what it is.  Go to clinic or hospital if you develop a fever, chest pain, severe weakness, weakness with sweating or dizziness.    Reports vaginal irritation, but no change in discharge or vaginal odor.  This is likely not related to the weakness.    At the end of the encounter, I discussed results, diagnosis, medications. Discussed red flags  for immediate return to clinic/ER, as well as indications for follow up if no improvement. Patient and/or caregiver  understood and agreed to plan. Patient was stable for discharge.

## 2021-06-20 NOTE — LETTER
Letter by Razia oJyner RN at      Author: Razia Joyner RN Service: -- Author Type: --    Filed:  Encounter Date: 7/24/2020 Status: (Other)       7/24/2020        Maribell Cabrera  [address] [place] [address]    COVID-19 Antibody Screen   Date Value Ref Range Status   07/20/2020 Negative  Final     Comment:     No COVID-19 antibodies detected.  Patients within 10 days of symptom onset for  COVID-19 may not produce sufficient levels of detectable antibodies.  Immunocompromised COVID-19 patients may take longer to develop antibodies.     COVID-19 IgG Titer   Date Value Ref Range Status   07/20/2020 Not Applicable  Final     Comment:     Qualitative screen for total antibodies to COVID-19 (SARS-CoV-2) with  semi-quantitative measurement of IgG COVID-19 antibodies by endpoint titer.  COVID-19 antibodies may be elevated due to a past or current infection.  Negative results do not rule out COVID-19 infection.  Results from antibody  testing should not be used as the sole basis to diagnose or exclude SARS-CoV-2  infection or to inform infection status.  COVID-19 PCR test should be ordered  if current infection is suspected.  False positive results may occur in rare  cases due to cross-reacting antibodies.  This test was developed and its performance characteristics determined by the  [facility] ([facility]),  which is regulated under CLIA as qualified to perform high-complexity testing.  This test has not been reviewed by the FDA.  Testing performed by [facility], [facility], [address], [address], [place] [address]       No results found for: [id number]    You have tested NEGATIVE for COVID-19 antibodies. This suggests you have not had or been exposed to COVID-19. But it does not mean that for sure.    The test finds antibodies in most people 10 days after they get sick. For some people, it  takes longer than 10 days for antibodies to show up. Others may never show antibodies against COVID-19, especially if they have weak immune systems.    If you have COVID-19 symptoms now, please stay home and away from others.     Your current symptoms may or may not be COVID-19.     What is antibody testing?  This is a kind of blood test. We take a small sample of your blood, and then test it for something called antibodies.   Your body makes antibodies to fight infection. If your blood has antibodies for a certain germ, it means youve been infected with that germ in the past.   Sometimes, antibodies stay in your body for years after youve had the infection. They can be there even if the germ didnt make you sick. They are a sign that your body fought off the infection.  Will this test find antibodies in everyone whos had COVID-19?  No. The test finds antibodies in most people 10 days after they get sick. For some people, it takes longer than 10 days for antibodies to show up. Others may never show antibodies against COVID-19, especially if they have weak immune systems.  What are the signs of COVID-19?  Signs of COVID-19 can appear from 2 to 14 days (up to 2 weeks) after youre infected. Some people have no symptoms or only mild symptoms. Others get very sick. The most common symptoms are:      Cough    Shortness of breath or trouble breathing    Or at least 2 of these symptoms:      Fever    Chills    Repeated shaking with chills    Muscle pain    Headache    Sore throat    Losing your sense of taste or smell    You may have other symptoms. Please contact your doctor or clinic for any symptoms that worry you.    Where can I get more information?     To learn the [facility] guidelines for staying home, please visit the [facility] website at [email or web address]    To learn more about COVID-19 and how to care for yourself at home, please visit the CDC  website at https://www.cdc.gov/coronavirus/2019-ncov/about/steps-when-sick.html    For more options for care at [facility], please visit our website at [email or web address]    [place] [facility] ([facility]) COVID-19 Hotline:  [phone]

## 2021-06-22 NOTE — PROGRESS NOTES
Assessment & Plan   1. Herpes zoster without complication  Discussed diagnosis, typical progression and resolution of symptoms.  At this time she has some mild pre-auricular edema but no erythema or lesions of the auditory canal and no hearing loss of tinnitus.  Advised to follow up if she begins to note any drainage from the ear or hearing changes.  No ocular involvement, also discussed signs to watch for related to this.  Pain currently uncontrolled with acetaminophen so did prescribe tramadol for her.  Advised on possible side effects and intention for short-term use.  Follow up as needed   - traMADol (ULTRAM) 50 mg tablet; Take 1 tablet (50 mg total) by mouth every 6 (six) hours as needed for pain.  Dispense: 10 tablet; Refill: 0    Ale Thomson CNP    Subjective   Chief Complaint:  Follow-up (shingles, blisters are developing, swelling on the right ear) and Nausea (x 3 days)    HPI:   Maribell Cabrera is a 53 y.o. female who presents for follow up.      Seen in [facility] 12/2 for left ear and jaw pain as well as erythema. Felt to be consistent with Shingles and treated with Valtrex.  She experienced vomiting with her first three doses of this and was advised to stop the medication.  Since being seen she has now developed vesicular lesions along the jaw.  Some mild swelling and erythema of the left tragus.  No hearing loss, tinnitus, drainage from the ear.  She has had no ocular involvement.  Denies changes in vision.  She has been quite uncomfortable and unable to sleep at night.  Taking acetaminophen 1000 mg every 6-8 hours.    She continues to experience significant nausea after stopping the valacyclovir.  Wonders if she contracted a viral gastroenteritis at the same time which caused the vomiting.  No diarrhea.  No fever.  Was able to keep down sips of smoothie this morning.  Did not consume much of anything yesterday.      Allergies:  has No Known Allergies.    SH/FH:  Social History and Family History reviewed  "and updated.   Tobacco Status:  She  reports that  has never smoked. she has never used smokeless tobacco.    Review of Systems:  A complete head to toe ROS is negative unless otherwise noted in HPI    Objective     Vitals:    12/05/18 0944   BP: 100/70   Patient Site: Left Arm   Patient Position: Sitting   Cuff Size: Adult Regular   Weight: 123 lb 0.6 oz (55.8 kg)   Height: 5' 5\" (1.651 m)       Physical Exam:  GENERAL: Alert, well-appearing female  SKIN: Left jaw, cheek, pre auricular area with localized erythema.  Large vesicular lesion on left anterior jaw.  No crusting  EYES: Conjunctiva pink, sclera white, no exudates. MAXIMUS.  EOMs intact.   EARS: No erythema or lesions in external or internal auditory canal. No drainage.  TMs pearly grey, no bulging, redness, retraction.   MOUTH: Ulcerated lesions of left buccal mucosa  NECK: No lymphadenopathy.          "

## 2021-06-23 NOTE — TELEPHONE ENCOUNTER
RN cannot approve Refill Request    RN can NOT refill this medication No established PCP. Last office visit: 1/27/2017 Deborah Jenkins MD Last Physical: 2/20/2018 Last MTM visit: Visit date not found Last visit same specialty: 12/5/2018 Ale Thomson CNP.  Next visit within 3 mo: Visit date not found  Next physical within 3 mo: Visit date not found      Shi Dacosta, [facility] Triage/Med Refill 2/10/2019    Requested Prescriptions   Pending Prescriptions Disp Refills     lisinopril-hydrochlorothiazide (PRINZIDE,ZESTORETIC) 10-12.5 mg per tablet [Pharmacy Med Name: LISINOPRIL/HCTZ TABS 10/12.5MG] 90 tablet 3     Sig: TAKE 1 TABLET DAILY    Diuretics/Combination Diuretics Refill Protocol  Passed - 2/10/2019  4:43 AM       Passed - Visit with PCP or prescribing provider visit in past 12 months    Last office visit with prescriber/PCP: 1/27/2017 Deborah Jenkins MD OR same dept: 12/5/2018 Ale Thomson CNP OR same specialty: 12/5/2018 Ale Thomson CNP  Last physical: 2/20/2018 Last MTM visit: Visit date not found   Next visit within 3 mo: Visit date not found  Next physical within 3 mo: Visit date not found  Prescriber OR PCP: Deborah Jenkins MD  Last diagnosis associated with med order: There are no diagnoses linked to this encounter.  If protocol passes may refill for 12 months if within 3 months of last provider visit (or a total of 15 months).            Passed - Serum Potassium in past 12 months     Lab Results   Component Value Date    Potassium 4.3 02/20/2018            Passed - Serum Sodium in past 12 months     Lab Results   Component Value Date    Sodium 135 (L) 02/20/2018            Passed - Blood pressure on file in past 12 months    BP Readings from Last 1 Encounters:   12/05/18 100/70            Passed - Serum Creatinine in past 12 months     Creatinine   Date Value Ref Range Status   02/20/2018 0.69 0.60 - 1.10 mg/dL Final

## 2021-06-26 NOTE — PROGRESS NOTES
Progress Notes by Jeremiah Woodward PA-C at 12/2/2018  3:10 PM     Author: Jeremiah Woodward PA-C Service: -- Author Type: Physician Assistant    Filed: 12/3/2018  7:47 AM Encounter Date: 12/2/2018 Status: Signed    : Jeremiah Woodward PA-C (Physician Assistant)       Subjective:      Patient ID: Maribell Cabrera is a 53 y.o. female.    Chief Complaint:    HPI  Maribell Cabrera is a 53 y.o. female who presents today complaining of pain by the left ear and radiates down the rami of the left jaw over the last 3 days.  Patient states that she felt an irritation and then a numbness tingling and burning that she describes was irritation on the left rami of the jaw.  She then had an eruption of a small erythematous skin rash on the left rami of the jaw.  At this time she has no involvement of the nose the supraorbital area or other areas of the left side of the face.  She has no drooping of the eyelid no tearing of the eyelid and no sharp shooting electrical shocks or pain into the face and no pain or irritation into the ear at this time.  She does state that at the TMJ joint and down into the back of the angle of the jaw to radiate into the throat she has intermittent irritation and pain as well.  He is continuing to eat and drink normally she has no problem with deglutition.  No overt fever and no rash anywhere else on the body especially in the.    No red eye, no photophobia, no supratrochlear rash.    She has not tried any treatment for this at home.  No current history of cancers or other immunocompromise problems.  She does not admit to any other health problems or symptoms at this time    No past medical history on file.    Past Surgical History:   Procedure Laterality Date   ? MOUTH SURGERY         Family History   Problem Relation Age of Onset   ? Breast cancer Mother 50   ? Melanoma Father         @79   ? Cancer Father         gastro stromal tumor   ? Diabetes Father    ? Hypertension Father    ? Heart attack Maternal  Grandfather    ? Stroke Paternal Grandmother    ? Coronary artery disease Paternal Grandfather    ? Breast cancer Sister 54   ? Heart disease Maternal Uncle    ? Arthritis Sister    ? Diverticulosis Sister    ? KIRSTIE disease Sister        Social History     Tobacco Use   ? Smoking status: Never Smoker   ? Smokeless tobacco: Never Used   Substance Use Topics   ? Alcohol use: Yes     Comment: Not much!   ? Drug use: No       Review of Systems  As above in HPI otherwise negative  Objective:     /78 (Patient Site: Right Arm, Patient Position: Sitting, Cuff Size: Adult Regular)   Pulse 63   Temp 98  F (36.7  C) (Oral)   Resp 14   Wt 127 lb 2 oz (57.7 kg)   LMP 11/08/2014   SpO2 98%   Breastfeeding? No   BMI 20.83 kg/m      Physical Exam   General: Patient is resting comfortably no acute distress is afebrile  HEENT: Head is normocephalic atraumatic   eyes are PERRL EOMI sclera anicteric   TMs are clear bilaterally no noted lesions in the left external auditory canal or lesions on the left scalp or posterior neck on the left side.  Herpetiform rash on the left rami of the jaw in dermatome distribution, V3 of trigeminal branch  Negative Silva's sign  Throat is clear and no exudate  No cervical lymphadenopathy present  LUNGS: Clear to auscultation bilaterally  HEART: Regular rate and rhythm  Skin: Without rash non-diaphoretic    Assessment:     Procedures    The encounter diagnosis was Herpes zoster without complication.      Plan:     1. Herpes zoster without complication  valACYclovir (VALTREX) 500 MG tablet       Multiple etiologies and diagnoses were considered to include herpes zoster oticus (Henrieville-Hunt syndrome), Trigeminal neurlagia or early herpes zoster.  I do favor the latter with the herpes zoster of the V3 branch of the trigeminal nerve since the patient does have rash and characteristic symptoms on the face in that distribution.  At this time she has no involvement of the ear eye or nose.  We  did go over in great detail indication for return emergently with ophthalmology if she should have any involvement of the eye.  Additionally, she will be treated empirically with the Valtrex and then have close follow-up with her primary care provider early in the week next week for reevaluation of her symptoms and improvement with treatment.  Patient voiced understanding of those concerns and questions were answered to patient's satisfaction before discharge.    Patient Instructions     Take the Valtrex medication for antiviral treatment for herpes zoster as written.  Follow-up with your primary care provider next week to reevaluate your symptoms and improvement with the treatment.  If at any time you have involvement on the tip of the nose or the eye follow-up immediately with an eye doctor or return to the emergency room.  Likewise of developing irritation around the opening of the ear or around the ear on the face and into the ear area follow-up with an ENT / ear specialist for definitive evaluation and treatment.      As a result of our visit today, here are the action plans for you:    1. Medication(s) to stop: There are no discontinued medications.    2. Medication(s) to start or change:   Medications Ordered   Medications   ? valACYclovir (VALTREX) 500 MG tablet     Sig: Take 2 tablets (1,000 mg total) by mouth 3 (three) times a day for 7 days.     Dispense:  42 tablet     Refill:  0       3. Other instructions: Yes           Patient Education     Shingles  Shingles is a viral infection caused by the same virus as chicken pox. Anyone who has had chicken pox may get shingles later in life. The virus stays in the body, but remains dormant (asleep). Shingles often occurs in older persons or persons with lowered immunity. But it can affect anyone at any age.  Shingles starts as a tingling patch of skin on one side of the body. Small, painful blisters may then appear. The rash does not spread to the rest of the  body.  Exposure to shingles cannot cause shingles. However, it can cause chicken pox in anyone who has not had chicken pox or has not been vaccinated. The contagious period ends when all blisters have crusted over (generally about 2 weeks after the illness begins).  After the blisters heal, the affected skin may be sensitive or painful for months (neuralgia). This often gradually goes away.  A shingles vaccine is available. This can help prevent shingles or make it less painful. It is generally recommended for adults over the age of 60 who have had chicken pox in the past, but who have never had shingles. Adults over 60 who have had neither chicken pox nor shingles can prevent both diseases with the chicken pox vaccine. Ask your healthcare provider about these vaccines.  Home care    Medicines may be prescribed to help relieve pain. Take these medicines as directed. Ask your healthcare provider or pharmacist before using over-the-counter medicines for helping treat pain and itching.    In certain cases, antiviral medicines may be prescribed to reduce pain, shorten the illness, and prevent neuralgia. Take these medicines as directed.    Compresses made from a solution of cool water mixed with cornstarch or baking soda may help relieve pain and itching.     Gently wash skin daily with soap and water to help prevent infection.  Be certain to rinse off all of the soap, which can be irritating.    Trim fingernails and try not to scratch. Scratching the sores may leave scars.    Stay home from work or school until all blisters have formed a crust and you are no longer contagious.  Follow-up care  Follow up with your healthcare provider or as directed by our staff.  When to seek medical advice    Fever of 100.4 F (38 C) or higher, or as directed by your healthcare provider    Affected skin is on the face or neck and any of the following occur:  ? Headache  ? Eye pain  ? Changes in vision  ? Sores near the eye  ? Weakness  of facial muscles    Pain, redness, or swelling of a joint    Signs of skin infection: colored drainage from the sores, warmth, increasing redness, or increasing pain  Date Last Reviewed: 9/25/2015 2000-2017 [employer]. [address] [place] [address]. All rights reserved. This information is not intended as a substitute for professional medical care. Always follow your healthcare professional's instructions.           Patient Education     Shingles (Herpes Zoster)     Talk to your healthcare provider about the shingles vaccine.     Shingles is also called herpes zoster. It is a painful skin rash caused by the herpes zoster virus. This is the same virus that causes chickenpox. After a person has chickenpox, the virus remains inactive in the nerve cells. Years later, the virus can become active again and travel to the skin. Most people have shingles only once, but it is possible to have it more than once.  What are the risk factors for shingles?  Anyone who has ever had chickenpox can develop shingles. But your risk is greater if you:    Are 50 years of age or older    Have an illness that weakens your immune system, such as HIV/AIDS    Have cancer, especially Hodgkin disease or lymphoma    Take medicines that weaken your immune system  What are the symptoms of shingles?    The first sign of shingles is usually pain, burning, tingling, or itching on one part of your face or body. You may also feel as if you have the flu, with fever and chills.    A red rash with small blisters appears within a few days. The rash may appear as follows:   ? The blisters can occur anywhere, but theyre most common on the back, chest, or abdomen.  ? They usually appear on only one side of the body, spreading along the nerve pathway where the virus was inactive.   ? The rash can also form around an eye, along one side of the face or neck, or in the mouth.  ? In a few people, usually those with weakened immune  systems, shingles appear on more than one part of the body at once.    After a few days, the blisters become dry and form a crust. The crust falls off in days to weeks. The blisters generally do not leave scars.  How is shingles treated?  For most people, shingles heals on its own in a few weeks. But treatment is recommended to help relieve pain, speed healing, and reduce the risk of complications. Antiviral medicines are prescribed within the first 72 hours of the appearance of the rash. To lessen symptoms:    Apply ice packs (wrapped in a thin towel) or cool compresses, or soak in a cool bath.    Use calamine lotion to calm itchy skin.    Ask your healthcare provider about over-the-counter pain relievers. If your pain is severe, your healthcare provider may prescribe stronger pain medicines.  What are the complications of shingles?  Shingles often goes away with no lasting effects. But some people have serious problems long after the blisters have healed:    Postherpetic neuralgia. This is the most common complication. It is severe nerve pain at the place where the rash used to be. It can last for months, or even years after you have had shingles. Medicines can be prescribed to help relieve the pain and improve quality of life.    Bacterial infection. Shingles blisters may become infected with bacteria. Antibiotic medicine is used to treat the infection.    Eye problems. A person with shingles on the face should see his or her healthcare provider right away. Shingles can cause serious problems with vision, and even blindness.  Very rarely shingles can also lead to pneumonia, hearing problems, brain inflammation, or even death.   When to seek medical care  Contact your healthcare provider if you experience any of the following:    Symptoms that dont go away with treatment    A rash or blisters near your eye    Increased drainage, fever, or rash after treatment, or severe pain that doesnt go away   How can shingles  be prevented?  You can only get shingles if you have had chickenpox in the past. Those who have never had chickenpox can get the virus from you. Although instead of developing shingles, the person may get chickenpox. Until your blisters form scabs, avoid contact with others, especially the following:    Pregnant women who have never had chickenpox or the vaccine    Infants who were born early (prematurely) or who had low weight at birth    People with weak immune system (for example, people receiving chemotherapy for cancer, people who have had organ transplants, or people with HIV infections)     The shingles vaccine  Shingles vaccines are available to help prevent shingles or make it less painful. Vaccination is recommended for adults 50 and older, even if you've had shingles in the past. Talk with your healthcare provider about the most appropriate time for you to get vaccinated, and which vaccine is best for you.   Date Last Reviewed: 10/1/2016    4433-5022 [employer]. [address] [place] [address], [place] [address]. All rights reserved. This information is not intended as a substitute for professional medical care. Always follow your healthcare professional's instructions.

## 2021-06-27 ENCOUNTER — HEALTH MAINTENANCE LETTER (OUTPATIENT)
Age: 56
End: 2021-06-27

## 2021-07-03 NOTE — ADDENDUM NOTE
Addendum Note by Corrine Colón CMA at 9/17/2019  1:15 PM     Author: Corrine Colón CMA Service: -- Author Type: Certified Medical Assistant    Filed: 9/17/2019  1:19 PM Encounter Date: 9/17/2019 Status: Signed    : Corrine Colón CMA (Certified Medical Assistant)    Addended by: CORRINE COLÓN on: 9/17/2019 01:19 PM        Modules accepted: Orders

## 2021-08-04 ENCOUNTER — ANCILLARY PROCEDURE (OUTPATIENT)
Dept: MAMMOGRAPHY | Facility: CLINIC | Age: 56
End: 2021-08-04
Attending: NURSE PRACTITIONER
Payer: COMMERCIAL

## 2021-08-04 DIAGNOSIS — Z12.31 VISIT FOR SCREENING MAMMOGRAM: ICD-10-CM

## 2021-08-04 PROCEDURE — 77067 SCR MAMMO BI INCL CAD: CPT | Mod: TC | Performed by: INTERNAL MEDICINE

## 2021-08-04 PROCEDURE — 77063 BREAST TOMOSYNTHESIS BI: CPT | Mod: TC | Performed by: INTERNAL MEDICINE

## 2021-08-25 ENCOUNTER — MYC MEDICAL ADVICE (OUTPATIENT)
Dept: FAMILY MEDICINE | Facility: CLINIC | Age: 56
End: 2021-08-25

## 2021-08-25 DIAGNOSIS — I10 ESSENTIAL HYPERTENSION, BENIGN: ICD-10-CM

## 2021-08-27 RX ORDER — LISINOPRIL/HYDROCHLOROTHIAZIDE 10-12.5 MG
1 TABLET ORAL DAILY
Qty: 90 TABLET | Refills: 3 | Status: SHIPPED | OUTPATIENT
Start: 2021-08-27 | End: 2021-09-21

## 2021-09-06 ENCOUNTER — APPOINTMENT (OUTPATIENT)
Dept: URGENT CARE | Facility: CLINIC | Age: 56
End: 2021-09-06
Payer: COMMERCIAL

## 2021-09-09 ENCOUNTER — APPOINTMENT (OUTPATIENT)
Dept: URGENT CARE | Facility: CLINIC | Age: 56
End: 2021-09-09
Payer: COMMERCIAL

## 2021-09-20 ENCOUNTER — MYC MEDICAL ADVICE (OUTPATIENT)
Dept: FAMILY MEDICINE | Facility: CLINIC | Age: 56
End: 2021-09-20

## 2021-09-20 DIAGNOSIS — I10 ESSENTIAL HYPERTENSION, BENIGN: ICD-10-CM

## 2021-09-21 RX ORDER — LISINOPRIL/HYDROCHLOROTHIAZIDE 10-12.5 MG
1 TABLET ORAL DAILY
Qty: 90 TABLET | Refills: 3 | Status: SHIPPED | OUTPATIENT
Start: 2021-09-21 | End: 2022-08-29

## 2021-10-17 ENCOUNTER — HEALTH MAINTENANCE LETTER (OUTPATIENT)
Age: 56
End: 2021-10-17

## 2021-10-24 ENCOUNTER — MYC MEDICAL ADVICE (OUTPATIENT)
Dept: FAMILY MEDICINE | Facility: CLINIC | Age: 56
End: 2021-10-24

## 2021-10-24 DIAGNOSIS — R30.0 DYSURIA: Primary | ICD-10-CM

## 2021-10-29 ENCOUNTER — LAB (OUTPATIENT)
Dept: LAB | Facility: CLINIC | Age: 56
End: 2021-10-29
Payer: COMMERCIAL

## 2021-10-29 DIAGNOSIS — R30.0 DYSURIA: ICD-10-CM

## 2021-10-29 DIAGNOSIS — N30.00 ACUTE CYSTITIS WITHOUT HEMATURIA: Primary | ICD-10-CM

## 2021-10-29 LAB
ALBUMIN UR-MCNC: 30 MG/DL
APPEARANCE UR: ABNORMAL
BACTERIA #/AREA URNS HPF: ABNORMAL /HPF
BILIRUB UR QL STRIP: NEGATIVE
COLOR UR AUTO: YELLOW
GLUCOSE UR STRIP-MCNC: NEGATIVE MG/DL
HGB UR QL STRIP: ABNORMAL
KETONES UR STRIP-MCNC: NEGATIVE MG/DL
LEUKOCYTE ESTERASE UR QL STRIP: ABNORMAL
NITRATE UR QL: NEGATIVE
PH UR STRIP: 7 [PH] (ref 5–8)
RBC #/AREA URNS AUTO: ABNORMAL /HPF
SP GR UR STRIP: 1.01 (ref 1–1.03)
SQUAMOUS #/AREA URNS AUTO: ABNORMAL /LPF
UROBILINOGEN UR STRIP-ACNC: 0.2 E.U./DL
WBC #/AREA URNS AUTO: ABNORMAL /HPF
WBC CLUMPS #/AREA URNS HPF: PRESENT /HPF

## 2021-10-29 PROCEDURE — 87186 SC STD MICRODIL/AGAR DIL: CPT

## 2021-10-29 PROCEDURE — 87086 URINE CULTURE/COLONY COUNT: CPT

## 2021-10-29 PROCEDURE — 81001 URINALYSIS AUTO W/SCOPE: CPT

## 2021-10-29 RX ORDER — NITROFURANTOIN 25; 75 MG/1; MG/1
100 CAPSULE ORAL 2 TIMES DAILY
Qty: 10 CAPSULE | Refills: 0 | Status: SHIPPED | OUTPATIENT
Start: 2021-10-29 | End: 2022-08-15

## 2021-10-31 LAB — BACTERIA UR CULT: ABNORMAL

## 2021-11-09 ENCOUNTER — IMMUNIZATION (OUTPATIENT)
Dept: NURSING | Facility: CLINIC | Age: 56
End: 2021-11-09
Payer: COMMERCIAL

## 2021-11-09 PROCEDURE — 0064A COVID-19,PF,MODERNA (18+ YRS BOOSTER .25ML): CPT

## 2021-11-09 PROCEDURE — 90682 RIV4 VACC RECOMBINANT DNA IM: CPT

## 2021-11-09 PROCEDURE — 90471 IMMUNIZATION ADMIN: CPT

## 2021-11-09 PROCEDURE — 91306 COVID-19,PF,MODERNA (18+ YRS BOOSTER .25ML): CPT

## 2022-03-01 ENCOUNTER — TRANSFERRED RECORDS (OUTPATIENT)
Dept: HEALTH INFORMATION MANAGEMENT | Facility: CLINIC | Age: 57
End: 2022-03-01
Payer: COMMERCIAL

## 2022-03-08 ENCOUNTER — TRANSFERRED RECORDS (OUTPATIENT)
Dept: HEALTH INFORMATION MANAGEMENT | Facility: CLINIC | Age: 57
End: 2022-03-08
Payer: COMMERCIAL

## 2022-03-14 ENCOUNTER — OFFICE VISIT (OUTPATIENT)
Dept: URGENT CARE | Facility: URGENT CARE | Age: 57
End: 2022-03-14
Payer: COMMERCIAL

## 2022-03-14 ENCOUNTER — NURSE TRIAGE (OUTPATIENT)
Dept: NURSING | Facility: CLINIC | Age: 57
End: 2022-03-14
Payer: COMMERCIAL

## 2022-03-14 VITALS
DIASTOLIC BLOOD PRESSURE: 86 MMHG | RESPIRATION RATE: 12 BRPM | BODY MASS INDEX: 21.16 KG/M2 | SYSTOLIC BLOOD PRESSURE: 124 MMHG | OXYGEN SATURATION: 100 % | HEART RATE: 64 BPM | HEIGHT: 65 IN | WEIGHT: 127 LBS | TEMPERATURE: 97 F

## 2022-03-14 DIAGNOSIS — R35.0 URINARY FREQUENCY: ICD-10-CM

## 2022-03-14 DIAGNOSIS — N30.01 ACUTE CYSTITIS WITH HEMATURIA: Primary | ICD-10-CM

## 2022-03-14 LAB
ALBUMIN UR-MCNC: NEGATIVE MG/DL
APPEARANCE UR: CLEAR
BACTERIA #/AREA URNS HPF: ABNORMAL /HPF
BILIRUB UR QL STRIP: NEGATIVE
COLOR UR AUTO: YELLOW
GLUCOSE UR STRIP-MCNC: NEGATIVE MG/DL
HGB UR QL STRIP: ABNORMAL
KETONES UR STRIP-MCNC: NEGATIVE MG/DL
LEUKOCYTE ESTERASE UR QL STRIP: ABNORMAL
NITRATE UR QL: NEGATIVE
PH UR STRIP: 7.5 [PH] (ref 5–7)
RBC #/AREA URNS AUTO: ABNORMAL /HPF
SP GR UR STRIP: 1.01 (ref 1–1.03)
UROBILINOGEN UR STRIP-ACNC: 0.2 E.U./DL
WBC #/AREA URNS AUTO: ABNORMAL /HPF

## 2022-03-14 PROCEDURE — 99213 OFFICE O/P EST LOW 20 MIN: CPT | Performed by: PHYSICIAN ASSISTANT

## 2022-03-14 PROCEDURE — 87186 SC STD MICRODIL/AGAR DIL: CPT | Performed by: PHYSICIAN ASSISTANT

## 2022-03-14 PROCEDURE — 81001 URINALYSIS AUTO W/SCOPE: CPT | Performed by: PHYSICIAN ASSISTANT

## 2022-03-14 PROCEDURE — 87086 URINE CULTURE/COLONY COUNT: CPT | Performed by: PHYSICIAN ASSISTANT

## 2022-03-14 RX ORDER — SULFAMETHOXAZOLE/TRIMETHOPRIM 800-160 MG
1 TABLET ORAL 2 TIMES DAILY
Qty: 6 TABLET | Refills: 0 | Status: SHIPPED | OUTPATIENT
Start: 2022-03-14 | End: 2022-03-17

## 2022-03-14 ASSESSMENT — ENCOUNTER SYMPTOMS
CONSTIPATION: 0
BLOOD IN STOOL: 0
FATIGUE: 1
FREQUENCY: 1
FLANK PAIN: 0
NAUSEA: 0
VOMITING: 0
FEVER: 0
HEMATURIA: 1

## 2022-03-14 NOTE — PROGRESS NOTES
Assessment & Plan:        Plan/Clinical Decision Making:    Patient having large blood and large leukocytes, with positive WBCs and RBCs.   Reviewed previous labs.   Normal creatinine and GFR from last year.   Reviewed last UC results from previous UTI.     Will treat with course of Bactrim.     Discussed repeat UA in 3-4 weeks to make sure blood in urine resolved.       ICD-10-CM    1. Acute cystitis with hematuria  N30.01 sulfamethoxazole-trimethoprim (BACTRIM DS) 800-160 MG tablet   2. Urinary frequency  R35.0 UA macro with reflex to Microscopic and Culture - Clinc Collect     Urine Microscopic Exam     Urine Culture         At the end of the encounter, I discussed results, diagnosis, medications. Discussed red flags for immediate return to clinic/ER, as well as indications for follow up if no improvement. Patient understood and agreed to plan. Patient was stable for discharge.        Laila Ramirez PA-C on 3/14/2022 at 10:56 AM          Subjective:     HPI:    Maribell is a 56 year old female who presents to clinic today for the following health issues:  Chief Complaint   Patient presents with     Urgent Care     Urinary Problem     feeling weak, blood in urine and urethra pain.      HPI    Patient complains of pain at urethra opening.   Feeling tired. Frequency, urgency.     Last UTI 10/21  History obtained from the patient.    Review of Systems   Constitutional: Positive for fatigue. Negative for fever.   Gastrointestinal: Negative for blood in stool, constipation, nausea and vomiting.   Genitourinary: Positive for frequency, hematuria and urgency. Negative for flank pain and vaginal discharge.       Problem List:  2020-07: Nonintractable headache, unspecified chronicity pattern,   unspecified headache type  2014-12: Seasonal affective disorder (H)  Essential Hypercholesterolemia  Carpal Tunnel Syndrome  Benign Essential Hypertension  Allergic Rhinitis  Hallux Valgus  Vitamin D Deficiency      No past medical  "history on file.    Social History     Tobacco Use     Smoking status: Never Smoker     Smokeless tobacco: Never Used   Substance Use Topics     Alcohol use: Yes     Comment: Alcoholic Drinks/day: Not much!             Objective:     Vitals:    03/14/22 1039   BP: 124/86   Pulse: 64   Resp: 12   Temp: 97  F (36.1  C)   TempSrc: Temporal   SpO2: 100%   Weight: 57.6 kg (127 lb)   Height: 1.651 m (5' 5\")         Physical Exam   EXAM:   Pleasant, alert, appropriate appearance. NAD.  Head Exam: Normocephalic, atraumatic.  Eye Exam:  PERRLA, EOMI, non icteric/injection.    ABD: soft, Non-tender,  Back: NO CVA tenderness  Ext/musculoskeletal: Grossly intact, No edema,  Neuro: CN II-XII intact grossly intact.  Skin: no rash or lesion.      Results:  Results for orders placed or performed in visit on 03/14/22   UA macro with reflex to Microscopic and Culture - Clinc Collect     Status: Abnormal    Specimen: Urine, Midstream   Result Value Ref Range    Color Urine Yellow Colorless, Straw, Light Yellow, Yellow    Appearance Urine Clear Clear    Glucose Urine Negative Negative mg/dL    Bilirubin Urine Negative Negative    Ketones Urine Negative Negative mg/dL    Specific Gravity Urine 1.015 1.003 - 1.035    Blood Urine Large (A) Negative    pH Urine 7.5 (H) 5.0 - 7.0    Protein Albumin Urine Negative Negative mg/dL    Urobilinogen Urine 0.2 0.2, 1.0 E.U./dL    Nitrite Urine Negative Negative    Leukocyte Esterase Urine Large (A) Negative   Urine Microscopic Exam     Status: Abnormal   Result Value Ref Range    Bacteria Urine Few (A) None Seen /HPF    RBC Urine  (A) 0-2 /HPF /HPF    WBC Urine  (A) 0-5 /HPF /HPF       "

## 2022-03-14 NOTE — TELEPHONE ENCOUNTER
"\"I am pretty sure I have an UTI.\"    Symptoms starting 1 week ago with urinary frequency, dysuria.    History of 1 UTI in past year.    Patient reporting waking x 5 times to void during the night. Stating she feels a little weaker in legs, denies difficulty ambulating.     Reporting urine is tinged with bright red blood with wiping. Denies clots.     Denies back pain or known fever (thermometer is not working).     Disposition to see today in office.    Patient agrees to go to [facility] at 10 a.m. this morning.     COVID 19 Nurse Triage Plan/Patient Instructions    Please be aware that novel coronavirus (COVID-19) may be circulating in the community. If you develop symptoms such as fever, cough, or SOB or if you have concerns about the presence of another infection including coronavirus (COVID-19), please contact your health care provider or visit [email or web address].     Disposition/Instructions    In-Person Visit with provider recommended. Reference Visit Selection Guide.    Thank you for taking steps to prevent the spread of this virus.  o Limit your contact with others.  o Wear a simple mask to cover your cough.  o Wash your hands well and often.    Resources    [facility]: About COVID-19: [email or web address]    CDC: What to Do If You're Sick: www.cdc.gov/coronavirus/2019-ncov/about/steps-when-sick.html    CDC: Ending Home Isolation: www.cdc.gov/coronavirus/2019-ncov/hcp/disposition-in-home-patients.html     CDC: Caring for Someone: www.cdc.gov/coronavirus/2019-ncov/if-you-are-sick/care-for-someone.html     [facility]: Interim Guidance for Hospital Discharge to Home: www.health.[facility].[place].us/diseases/coronavirus/hcp/hospdischarge.pdf    [facility] clinical trials (COVID-19 research studies): clinicalaffairs.[facility]/umn-clinical-trials     Below are the COVID-19 hotlines at the [place] [facility] of [facility] ([facility]). Interpreters are available.   o For health " questions: Call 430-559-7217 or 1-897.371.6879 (7 a.m. to 7 p.m.)  o For questions about schools and childcare: Call 885-677-3157 or 1-535.603.9179 (7 a.m. to 7 p.m.)                               Reason for Disposition    Age > 50 years    Additional Information    Negative: Shock suspected (e.g., cold/pale/clammy skin, too weak to stand, low BP, rapid pulse)    Negative: Sounds like a life-threatening emergency to the triager    Negative: Unable to urinate (or only a few drops) and bladder feels very full    Negative: Vomiting    Negative: Patient sounds very sick or weak to the triager    Negative: SEVERE pain with urination    Negative: Fever > 100.4 F (38.0 C)    Negative: Side (flank) or lower back pain present    Negative: Taking antibiotic > 24 hours for UTI and fever persists    Negative: Taking antibiotic > 3 days for UTI and painful urination not improved    Negative: Unusual vaginal discharge    Negative: > 2 UTIs in last year    Negative: Patient is worried about sexually transmitted disease (STD)    Protocols used: URINATION PAIN - FEMALE-A-[place]

## 2022-03-15 ENCOUNTER — TRANSFERRED RECORDS (OUTPATIENT)
Dept: HEALTH INFORMATION MANAGEMENT | Facility: CLINIC | Age: 57
End: 2022-03-15
Payer: COMMERCIAL

## 2022-03-15 LAB — BACTERIA UR CULT: ABNORMAL

## 2022-03-19 ENCOUNTER — OFFICE VISIT (OUTPATIENT)
Dept: URGENT CARE | Facility: URGENT CARE | Age: 57
End: 2022-03-19
Payer: COMMERCIAL

## 2022-03-19 ENCOUNTER — NURSE TRIAGE (OUTPATIENT)
Dept: NURSING | Facility: CLINIC | Age: 57
End: 2022-03-19
Payer: COMMERCIAL

## 2022-03-19 VITALS
BODY MASS INDEX: 21.16 KG/M2 | WEIGHT: 127 LBS | RESPIRATION RATE: 20 BRPM | SYSTOLIC BLOOD PRESSURE: 135 MMHG | HEIGHT: 65 IN | TEMPERATURE: 98.1 F | DIASTOLIC BLOOD PRESSURE: 84 MMHG | HEART RATE: 55 BPM | OXYGEN SATURATION: 99 %

## 2022-03-19 DIAGNOSIS — R30.0 DYSURIA: Primary | ICD-10-CM

## 2022-03-19 LAB
ALBUMIN UR-MCNC: NEGATIVE MG/DL
APPEARANCE UR: CLEAR
BACTERIA #/AREA URNS HPF: ABNORMAL /HPF
BILIRUB UR QL STRIP: NEGATIVE
COLOR UR AUTO: YELLOW
GLUCOSE UR STRIP-MCNC: NEGATIVE MG/DL
HGB UR QL STRIP: ABNORMAL
KETONES UR STRIP-MCNC: NEGATIVE MG/DL
LEUKOCYTE ESTERASE UR QL STRIP: ABNORMAL
NITRATE UR QL: NEGATIVE
PH UR STRIP: 7 [PH] (ref 5–7)
RBC #/AREA URNS AUTO: ABNORMAL /HPF
SP GR UR STRIP: 1.01 (ref 1–1.03)
SQUAMOUS #/AREA URNS AUTO: ABNORMAL /LPF
UROBILINOGEN UR STRIP-ACNC: 0.2 E.U./DL
WBC #/AREA URNS AUTO: ABNORMAL /HPF

## 2022-03-19 PROCEDURE — 99213 OFFICE O/P EST LOW 20 MIN: CPT | Performed by: FAMILY MEDICINE

## 2022-03-19 PROCEDURE — 81001 URINALYSIS AUTO W/SCOPE: CPT | Performed by: FAMILY MEDICINE

## 2022-03-19 RX ORDER — NITROFURANTOIN 25; 75 MG/1; MG/1
100 CAPSULE ORAL 2 TIMES DAILY
Qty: 10 CAPSULE | Refills: 0 | Status: SHIPPED | OUTPATIENT
Start: 2022-03-19 | End: 2022-03-24

## 2022-03-19 NOTE — PROGRESS NOTES
Assessment & Plan     Dysuria  Ongoing discomfort but has had some improvement she requests further antibiotic treatment after recent completion of 3 days of bactrim. I have provided her with 5 days of macrobid. She understands to return should symptoms worse.   - UA macro with reflex to Microscopic and Culture - Clinc Collect  - Urine Microscopic  - nitroFURantoin macrocrystal-monohydrate (MACROBID) 100 MG capsule  Dispense: 10 capsule; Refill: 0         Aydin Smith MD   [place] [facility]    Mitra Reyna is a 56 year old female who presents to clinic today for the following health issues:  Chief Complaint   Patient presents with     Urgent Care     Pt in clinic to have eval for dysuria. Pt was here monday.     Dysuria     HPI    Since Monday she has reported some improvement in her symptoms.   No hx of kidney stones. Prior to these episodes she has no hx of blood.   She reports no fever. She feels a little bit off and has less desire to do her typical 40 minute walk.     Absent of nausea/flank pain/fever.     Patient Active Problem List    Diagnosis Date Noted     Nonintractable headache, unspecified chronicity pattern, unspecified headache type 07/20/2020     Priority: Medium     Allergic Rhinitis      Priority: Medium     Created by Conversion  Replacement Utility updated for latest IMO load         Hallux Valgus      Priority: Medium     Created by Conversion  Replacement Utility updated for latest IMO load         Vitamin D Deficiency      Priority: Medium     Created by Conversion  Replacement Utility updated for latest IMO load         Seasonal affective disorder (H) 12/29/2014     Priority: Medium     Essential Hypercholesterolemia      Priority: Medium     Created by Conversion         Carpal Tunnel Syndrome      Priority: Medium     Created by Conversion         Benign Essential Hypertension      Priority: Medium     Created by Conversion           Current Outpatient Medications  "  Medication     nitroFURantoin macrocrystal-monohydrate (MACROBID) 100 MG capsule     cholecalciferol, vitamin D3, 1,000 unit tablet     gabapentin (NEURONTIN) 100 MG capsule     lisinopril-hydrochlorothiazide (ZESTORETIC) 10-12.5 MG tablet     loratadine (CLARITIN) 10 mg tablet     nitroFURantoin macrocrystal-monohydrate (MACROBID) 100 MG capsule     No current facility-administered medications for this visit.           Objective    /84   Pulse 55   Temp 98.1  F (36.7  C) (Temporal)   Resp 20   Ht 1.651 m (5' 5\")   Wt 57.6 kg (127 lb)   SpO2 99%   BMI 21.13 kg/m    Physical Exam   GEN: NAD, normal mentation, good insight    Results for orders placed or performed in visit on 03/19/22   UA macro with reflex to Microscopic and Culture - Clinc Collect     Status: Abnormal    Specimen: Urine, Midstream   Result Value Ref Range    Color Urine Yellow Colorless, Straw, Light Yellow, Yellow    Appearance Urine Clear Clear    Glucose Urine Negative Negative mg/dL    Bilirubin Urine Negative Negative    Ketones Urine Negative Negative mg/dL    Specific Gravity Urine 1.015 1.003 - 1.035    Blood Urine Large (A) Negative    pH Urine 7.0 5.0 - 7.0    Protein Albumin Urine Negative Negative mg/dL    Urobilinogen Urine 0.2 0.2, 1.0 E.U./dL    Nitrite Urine Negative Negative    Leukocyte Esterase Urine Trace (A) Negative   Urine Microscopic     Status: Abnormal   Result Value Ref Range    Bacteria Urine Few (A) None Seen /HPF    RBC Urine 5-10 (A) 0-2 /HPF /HPF    WBC Urine 5-10 (A) 0-5 /HPF /HPF    Squamous Epithelials Urine None Seen None Seen /LPF    Narrative    Urine Culture not indicated                     The use of Dragon/[employer] services may have been used to construct the content in this note; any grammatical or spelling errors are non-intentional. Please contact the author of this note directly if you are in need of any clarification.   "

## 2022-03-19 NOTE — PATIENT INSTRUCTIONS
Stay hydrated:  ounces of water a day to keep hydrated      Macrobid/nitrofurantoin twice a day for 5 days to treat symptoms of bladder infection      If you develop fever/vomiting/flank or abdominal discomfort -- return to be evaluated

## 2022-03-19 NOTE — TELEPHONE ENCOUNTER
Pt calling with concerns about;    Seen at [facility] for UTI on 3/14/22 - took 3 days of Bactrim twice daily. Finished antibiotic on 3/17/22 but continued with symptoms;  Frequency/urgency   Odorous urine  Feeling physically weaker than usual  Feeling hot and then chilled at times  Today began with blood in urine    Pt reports that she has Hx of kidney infection and states she is concerned about this.    Pt Denies;  Fever  Lower back or flank pain  Vomiting  Unable to urinate    According to the protocol, patient should see a physician or HCP within 24 hours.  Care advice given. Patient verbalizes understanding and agrees with plan of care.    Maribell Roland RN, Nurse Advisor 3:02 PM 3/19/2022  COVID 19 Nurse Triage Plan/Patient Instructions    Please be aware that novel coronavirus (COVID-19) may be circulating in the community. If you develop symptoms such as fever, cough, or SOB or if you have concerns about the presence of another infection including coronavirus (COVID-19), please contact your health care provider or visit [email or web address].     Disposition/Instructions    In-Person Visit with provider recommended. Reference Visit Selection Guide.    Thank you for taking steps to prevent the spread of this virus.  o Limit your contact with others.  o Wear a simple mask to cover your cough.  o Wash your hands well and often.    Reason for Disposition    Urinary tract infection suspected, but not taking antibiotics    Blood in urine (red, pink, or tea-colored)    Additional Information    Negative: Shock suspected (e.g., cold/pale/clammy skin, too weak to stand, low BP, rapid pulse)    Negative: Sounds like a life-threatening emergency to the triager    Negative: Followed a genital area injury    Negative: Taking antibiotic for urinary tract infection (UTI)    Negative: Pregnant    Negative: Postpartum (from 0 to 6 weeks after delivery)    Negative: [1] Unable to urinate (or only a few drops) > 4 hours AND [2]  bladder feels very full (e.g., palpable bladder or strong urge to urinate)    Negative: Vomiting    Negative: Patient sounds very sick or weak to the triager    Negative: [1] SEVERE pain with urination  (e.g., excruciating) AND [2] not improved after 2 hours of pain medicine and Sitz bath    Negative: Fever > 100.4 F (38.0 C)    Negative: Side (flank) or lower back pain present    Negative: Diabetes mellitus or weak immune system (e.g., HIV positive, cancer chemo, splenectomy, organ transplant, chronic steroids)    Negative: Bedridden (e.g., nursing home patient, CVA, chronic illness, recovering from surgery)    Negative: Artificial heart valve or artificial joint    Negative: Unusual vaginal discharge (e.g., bad smelling, yellow, green, or foamy-white)    Negative: Patient is worried about sexually transmitted disease (STD)    Negative: Possibility of pregnancy    Protocols used: URINARY TRACT INFECTION ON ANTIBIOTIC FOLLOW-UP CALL - FEMALE-A-, URINATION PAIN - FEMALE-A-

## 2022-06-02 ENCOUNTER — IMMUNIZATION (OUTPATIENT)
Dept: NURSING | Facility: CLINIC | Age: 57
End: 2022-06-02
Payer: COMMERCIAL

## 2022-06-02 PROCEDURE — 0064A COVID-19,PF,MODERNA (18+ YRS BOOSTER .25ML): CPT

## 2022-06-02 PROCEDURE — 91306 COVID-19,PF,MODERNA (18+ YRS BOOSTER .25ML): CPT

## 2022-07-23 ENCOUNTER — HEALTH MAINTENANCE LETTER (OUTPATIENT)
Age: 57
End: 2022-07-23

## 2022-08-11 ENCOUNTER — ANCILLARY PROCEDURE (OUTPATIENT)
Dept: MAMMOGRAPHY | Facility: CLINIC | Age: 57
End: 2022-08-11
Payer: COMMERCIAL

## 2022-08-11 DIAGNOSIS — Z12.31 VISIT FOR SCREENING MAMMOGRAM: ICD-10-CM

## 2022-08-11 PROCEDURE — 77063 BREAST TOMOSYNTHESIS BI: CPT | Mod: TC | Performed by: RADIOLOGY

## 2022-08-11 PROCEDURE — 77067 SCR MAMMO BI INCL CAD: CPT | Mod: TC | Performed by: RADIOLOGY

## 2022-08-15 ENCOUNTER — OFFICE VISIT (OUTPATIENT)
Dept: FAMILY MEDICINE | Facility: CLINIC | Age: 57
End: 2022-08-15
Payer: COMMERCIAL

## 2022-08-15 ENCOUNTER — HOSPITAL ENCOUNTER (OUTPATIENT)
Dept: GENERAL RADIOLOGY | Facility: HOSPITAL | Age: 57
Discharge: HOME | End: 2022-08-15
Attending: NURSE PRACTITIONER | Admitting: NURSE PRACTITIONER
Payer: COMMERCIAL

## 2022-08-15 VITALS
OXYGEN SATURATION: 99 % | TEMPERATURE: 98.5 F | WEIGHT: 122.9 LBS | BODY MASS INDEX: 20.45 KG/M2 | SYSTOLIC BLOOD PRESSURE: 138 MMHG | RESPIRATION RATE: 16 BRPM | HEART RATE: 60 BPM | DIASTOLIC BLOOD PRESSURE: 87 MMHG

## 2022-08-15 DIAGNOSIS — M25.551 HIP PAIN, RIGHT: Primary | ICD-10-CM

## 2022-08-15 DIAGNOSIS — M25.551 HIP PAIN, RIGHT: ICD-10-CM

## 2022-08-15 PROCEDURE — 99214 OFFICE O/P EST MOD 30 MIN: CPT | Performed by: NURSE PRACTITIONER

## 2022-08-15 PROCEDURE — 73502 X-RAY EXAM HIP UNI 2-3 VIEWS: CPT | Mod: FY

## 2022-08-15 RX ORDER — DIPHENOXYLATE HYDROCHLORIDE AND ATROPINE SULFATE 2.5; .025 MG/1; MG/1
1 TABLET ORAL
COMMUNITY
End: 2023-01-13

## 2022-08-15 ASSESSMENT — ENCOUNTER SYMPTOMS
CHILLS: 0
FEVER: 0

## 2022-08-15 NOTE — PROGRESS NOTES
Assessment & Plan     Hip pain, right    - XR Hip Right 2-3 Views  -Patient does not have PCP currently.  Ideally, follow-up with new PCP in the next 1 to 2 weeks or [facility] - card given.       Patient with right groin and right superior buttock pain.  Differential includes low back origin pain radiating around to the front, labral tear, hip arthritis, SI joint discomfort, gluteus medius tendinitis.    X-ray -negative for fracture or significant arthritis.    Avoid vigorous activity.  Ibuprofen as needed 400 to 600 mg every 6 hours.  Warm packs to groin and buttock area.              Return in about 1 week (around 8/22/2022).    Brittney Gibson [facility]     Maribell is a 57 year old female who presents to clinic today for the following health issues:  Chief Complaint   Patient presents with     Musculoskeletal Problem     Discomfort on Rt hip. Dull sensation when moving Rt leg back. Pt state she can't sleep on Rt side at all x begin July     HPI    Dull, aching right groin area pain for about a month.  Has been getting slowly worse since then.  Has been doing a lot of gardening and bending at the waist related to this.  Otherwise, no significant increase in activity recently.  Pain is currently 3 out of 10.    Pain also located on the right superior buttock area.  Did have right lower back pain recently.    Does not have a PCP because the last 2 that she had to have moved to other locations.            Review of Systems   Constitutional: Negative for chills and fever.           Objective    /87 (BP Location: Right arm, Patient Position: Sitting, Cuff Size: Adult Regular)   Pulse 60   Temp 98.5  F (36.9  C) (Oral)   Resp 16   Wt 55.7 kg (122 lb 14.4 oz)   SpO2 99%   BMI 20.45 kg/m    Physical Exam  Constitutional:       Appearance: Normal appearance.   Pulmonary:      Effort: Pulmonary effort is normal.   Musculoskeletal:         General: Tenderness  (Right groin.  Right superior buttock) present. Normal range of motion.      Comments: Pain worsening with extreme external rotation of the hip.  No increased pain with internal rotation.   Neurological:      Mental Status: She is alert.   Psychiatric:         Mood and Affect: Mood normal.         Behavior: Behavior normal.         Thought Content: Thought content normal.         Judgment: Judgment normal.        Results for orders placed or performed during the hospital encounter of 08/15/22   XR Hip Right 2-3 Views     Status: None    Narrative    EXAM: XR HIP RIGHT 2-3 VIEWS  LOCATION: [facility]  DATE/TIME: 8/15/2022 6:15 PM    INDICATION: Right hip and groin pain.  COMPARISON: None.      Impression    IMPRESSION: Normal joint spaces and alignment. No fracture.        I independently visualized the xray: No fractures nor significant arthritis seen.

## 2022-08-15 NOTE — PATIENT INSTRUCTIONS
You do not have significant arthritis or other issues seen on your x-ray.    Ideally, you would make a primary care appointment to be rechecked for this in the next 1 to 2 weeks.    If there are no appointments available, feel free to see an orthopedist for recheck.  You can make an appointment with [facility] hip specialist.      In the meantime, you may take ibuprofen as needed 400 to 600 mg up to 4 times daily, especially right before bed.  Try heating pad to the sore areas.

## 2022-08-28 DIAGNOSIS — I10 ESSENTIAL HYPERTENSION, BENIGN: ICD-10-CM

## 2022-08-29 NOTE — TELEPHONE ENCOUNTER
"[facility] & has not established care with another provider.  Please assign refill request to covering provider per clinic standard process.    Routing refill request to provider for review/approval because:  Labs not current:  Multiple  Patient needs to be seen because it has been more than 1 year since last office visit.  Early refill requested.  No PCP    Last Written Prescription Date:  9/21/21  Last Fill Quantity: 90,  # refills: 3   Last office visit provider:  4/19/21     Requested Prescriptions   Pending Prescriptions Disp Refills     lisinopril-hydrochlorothiazide (ZESTORETIC) 10-12.5 MG tablet [Pharmacy Med Name: LISINOPRIL/HCTZ TABS 10/12.5MG] 90 tablet 3     Sig: TAKE 1 TABLET DAILY       Diuretics (Including Combos) Protocol Failed - 8/29/2022  8:51 AM        Failed - Normal serum creatinine on file in past 12 months     Recent Labs   Lab Test 04/19/21  1437   CR 0.72              Failed - Normal serum potassium on file in past 12 months     Recent Labs   Lab Test 04/19/21  1437   POTASSIUM 4.2                    Failed - Normal serum sodium on file in past 12 months     Recent Labs   Lab Test 04/19/21  1437   *              Passed - Blood pressure under 140/90 in past 12 months     BP Readings from Last 3 Encounters:   08/15/22 138/87   03/19/22 135/84   03/14/22 124/86                 Passed - Recent (12 mo) or future (30 days) visit within the authorizing provider's specialty     Patient has had an office visit with the authorizing provider or a provider within the authorizing providers department within the previous 12 mos or has a future within next 30 days. See \"Patient Info\" tab in inbasket, or \"Choose Columns\" in Meds & Orders section of the refill encounter.              Passed - Medication is active on med list        Passed - Patient is age 18 or older        Passed - No active pregancy on record        Passed - No positive pregnancy test in past 12 months       [facility] " "Inhibitors (Including Combos) Protocol Failed - 8/29/2022  8:51 AM        Failed - Normal serum creatinine on file in past 12 months     Recent Labs   Lab Test 04/19/21  1437   CR 0.72       Ok to refill medication if creatinine is low          Failed - Normal serum potassium on file in past 12 months     Recent Labs   Lab Test 04/19/21  1437   POTASSIUM 4.2             Passed - Blood pressure under 140/90 in past 12 months     BP Readings from Last 3 Encounters:   08/15/22 138/87   03/19/22 135/84   03/14/22 124/86                 Passed - Recent (12 mo) or future (30 days) visit within the authorizing provider's specialty     Patient has had an office visit with the authorizing provider or a provider within the authorizing providers department within the previous 12 mos or has a future within next 30 days. See \"Patient Info\" tab in inbasket, or \"Choose Columns\" in Meds & Orders section of the refill encounter.              Passed - Medication is active on med list        Passed - Patient is age 18 or older        Passed - No active pregnancy on record        Passed - No positive pregnancy test within past 12 months             Rah Horvath RN 08/29/22 8:51 AM  "

## 2022-09-07 ENCOUNTER — TELEPHONE (OUTPATIENT)
Dept: FAMILY MEDICINE | Facility: CLINIC | Age: 57
End: 2022-09-07

## 2022-09-07 NOTE — TELEPHONE ENCOUNTER
Reason for Call:  Medication or medication refill:     Do you use a [facility]?  Name of the pharmacy and phone number for the current request:  [facility]      Name of the medication requested: Lycinopril     Other request: Well check scheduled for 10/14 but only has one month remaining. She would also like to know if she should come in fasting for the apt. If someone could return her call she would appreciate it      Can we leave a detailed message on this number? YES     Phone number patient can be reached at: Home number on file [phone] (home)     Best Time: any     Call taken on 9/7/2022 at 1:32 PM by Chapis Camilo

## 2022-09-13 NOTE — TELEPHONE ENCOUNTER
No fasting needed. Also if she is looking to establish care with a new provider, I do not accept new patients at this time.     Dr. Khalif Smith  9/13/2022 12:14 PM

## 2022-09-13 NOTE — TELEPHONE ENCOUNTER
Unable to reach patient. Left detailed message with doctor's message.     Can we leave a detailed message on this number? YES     Patient notified that 10/14/22 establish care with Dr. Smith cancelled as not accepting new patients (need to reschedule establish care with [facility] or [facility]).     Left detailed message that patient does not need to fast for establish care physical.

## 2022-09-26 RX ORDER — LISINOPRIL/HYDROCHLOROTHIAZIDE 10-12.5 MG
1 TABLET ORAL DAILY
Qty: 30 TABLET | Refills: 0 | Status: SHIPPED | OUTPATIENT
Start: 2022-09-26 | End: 2022-10-07

## 2022-09-27 ENCOUNTER — OFFICE VISIT (OUTPATIENT)
Dept: FAMILY MEDICINE | Facility: CLINIC | Age: 57
End: 2022-09-27
Payer: COMMERCIAL

## 2022-09-27 VITALS
BODY MASS INDEX: 21.08 KG/M2 | HEART RATE: 62 BPM | SYSTOLIC BLOOD PRESSURE: 152 MMHG | TEMPERATURE: 98.2 F | RESPIRATION RATE: 20 BRPM | OXYGEN SATURATION: 100 % | DIASTOLIC BLOOD PRESSURE: 79 MMHG | WEIGHT: 126.7 LBS

## 2022-09-27 DIAGNOSIS — I10 ESSENTIAL HYPERTENSION, BENIGN: ICD-10-CM

## 2022-09-27 DIAGNOSIS — I95.1 ORTHOSTATIC HYPOTENSION: Primary | ICD-10-CM

## 2022-09-27 DIAGNOSIS — R42 DIZZINESS: ICD-10-CM

## 2022-09-27 LAB
ALBUMIN UR-MCNC: NEGATIVE MG/DL
ANION GAP SERPL CALCULATED.3IONS-SCNC: 11 MMOL/L (ref 7–15)
APPEARANCE UR: CLEAR
ATRIAL RATE - MUSE: 59 BPM
BILIRUB UR QL STRIP: NEGATIVE
BUN SERPL-MCNC: 11.5 MG/DL (ref 6–20)
CALCIUM SERPL-MCNC: 9.9 MG/DL (ref 8.6–10)
CHLORIDE SERPL-SCNC: 96 MMOL/L (ref 98–107)
COLOR UR AUTO: YELLOW
CREAT SERPL-MCNC: 0.63 MG/DL (ref 0.51–0.95)
DEPRECATED HCO3 PLAS-SCNC: 26 MMOL/L (ref 22–29)
DIASTOLIC BLOOD PRESSURE - MUSE: NORMAL MMHG
ERYTHROCYTE [DISTWIDTH] IN BLOOD BY AUTOMATED COUNT: 12.7 % (ref 10–15)
GFR SERPL CREATININE-BSD FRML MDRD: >90 ML/MIN/1.73M2
GLUCOSE SERPL-MCNC: 94 MG/DL (ref 70–99)
GLUCOSE UR STRIP-MCNC: NEGATIVE MG/DL
HCT VFR BLD AUTO: 40.7 % (ref 35–47)
HGB BLD-MCNC: 13.9 G/DL (ref 11.7–15.7)
HGB UR QL STRIP: NEGATIVE
INTERPRETATION ECG - MUSE: NORMAL
KETONES UR STRIP-MCNC: NEGATIVE MG/DL
LEUKOCYTE ESTERASE UR QL STRIP: NEGATIVE
MCH RBC QN AUTO: 30.2 PG (ref 26.5–33)
MCHC RBC AUTO-ENTMCNC: 34.2 G/DL (ref 31.5–36.5)
MCV RBC AUTO: 88 FL (ref 78–100)
NITRATE UR QL: NEGATIVE
P AXIS - MUSE: 22 DEGREES
PH UR STRIP: 7 [PH] (ref 5–8)
PLATELET # BLD AUTO: 320 10E3/UL (ref 150–450)
POTASSIUM SERPL-SCNC: 4.4 MMOL/L (ref 3.4–5.3)
PR INTERVAL - MUSE: 114 MS
QRS DURATION - MUSE: 82 MS
QT - MUSE: 450 MS
QTC - MUSE: 445 MS
R AXIS - MUSE: 27 DEGREES
RBC # BLD AUTO: 4.61 10E6/UL (ref 3.8–5.2)
SODIUM SERPL-SCNC: 133 MMOL/L (ref 136–145)
SP GR UR STRIP: 1.01 (ref 1–1.03)
SYSTOLIC BLOOD PRESSURE - MUSE: NORMAL MMHG
T AXIS - MUSE: 38 DEGREES
TSH SERPL DL<=0.005 MIU/L-ACNC: 1.84 UIU/ML (ref 0.3–4.2)
UROBILINOGEN UR STRIP-ACNC: 0.2 E.U./DL
VENTRICULAR RATE- MUSE: 59 BPM
WBC # BLD AUTO: 5.7 10E3/UL (ref 4–11)

## 2022-09-27 PROCEDURE — 36415 COLL VENOUS BLD VENIPUNCTURE: CPT | Performed by: PHYSICIAN ASSISTANT

## 2022-09-27 PROCEDURE — 85027 COMPLETE CBC AUTOMATED: CPT | Performed by: PHYSICIAN ASSISTANT

## 2022-09-27 PROCEDURE — 93005 ELECTROCARDIOGRAM TRACING: CPT | Performed by: PHYSICIAN ASSISTANT

## 2022-09-27 PROCEDURE — 84443 ASSAY THYROID STIM HORMONE: CPT | Performed by: PHYSICIAN ASSISTANT

## 2022-09-27 PROCEDURE — 80048 BASIC METABOLIC PNL TOTAL CA: CPT | Performed by: PHYSICIAN ASSISTANT

## 2022-09-27 PROCEDURE — 93010 ELECTROCARDIOGRAM REPORT: CPT | Performed by: INTERNAL MEDICINE

## 2022-09-27 PROCEDURE — 86618 LYME DISEASE ANTIBODY: CPT | Performed by: PHYSICIAN ASSISTANT

## 2022-09-27 PROCEDURE — 81003 URINALYSIS AUTO W/O SCOPE: CPT | Performed by: PHYSICIAN ASSISTANT

## 2022-09-27 PROCEDURE — 99215 OFFICE O/P EST HI 40 MIN: CPT | Performed by: PHYSICIAN ASSISTANT

## 2022-09-27 ASSESSMENT — ENCOUNTER SYMPTOMS
CHILLS: 0
DIZZINESS: 1
HEADACHES: 0
FATIGUE: 1
SHORTNESS OF BREATH: 0
FEVER: 0
NAUSEA: 1
VOMITING: 0
COUGH: 0

## 2022-09-27 NOTE — PATIENT INSTRUCTIONS
Continue with your current medications as you have been  Recommend getting a blood pressure cuff and monitoring her blood pressures once a day at home.  The timing of monitoring can vary.  Keep a log and bring it into your next primary care visit  You will be notified of your lab results when they are back.  Seek emergency medical attention if you are experiencing severe head pain, confusion, fainting, or focal weaknesses.

## 2022-09-27 NOTE — PROGRESS NOTES
"Patient presents with:  Dizziness: X 1.5 week. Weakness, no blurred vision, unbalance \"some times needs to hold onto something\", no headache or fever. Pt states have had hot flashes.  Nausea: Slight nauseous x 1.5 week.      Clinical Decision Making:  DDx for Dizziness inclues the following: vertigo, orthostatic hypotension, hypoglycemia, POTS, hyponatremia, dehydration, Lyme, thyroid disease, middle ear infection, arrhythmia, TIA/stroke, structural lesions (primary or metastatic tumor, intracranial hemorrhage).        Doubt BPPV or other vertiginous etiology given he denies room-spinning type dizziness.    Glucose is in process today, unlikely hypoglycemia.    Sodium in process today, unlikely hypernatremia.    Vitals normal, no signs of dry mucous membranes.  Mild dehydration possible.  Patient admits to not drinking a lot of fluids.    Orthostatics blood pressure is consistent with orthostatic hypotension.  But no postural tachycardia    TMs both appear normal and patient no complaining of ear pain or fever to suggest otitis media.    No neurological findings to suggest TIA/Stroke.    Given lack of neurological findings, doubt structural lesions and I do not believe that imaging is indicated at this time.        Test process.    Based on history and exam, the most likely etiology of this patient's dizziness is Orthostatic Hypotension.  I did discuss this case with another primary care provider to discuss if I should be altering patient's blood pressure medication, but he recommended having her monitor her blood pressures instead.     Orthostatic hypotension may have variety of potential causes.  Patient has not had any medication changes, but he is on lisinopril hydrochlorothiazide for blood pressure management.  Since her blood pressures are at a stable level when seated and laying I will not alter any blood pressure medications at this time.  Symptoms could also be neurologic deficits, dehydration, and this " could be an incidental finding with another etiology as the cause of her dizziness.      ICD-10-CM    1. Orthostatic hypotension  I95.1 TSH     EKG 12-lead, tracing only     TSH   2. Dizziness  R42 UA macro with reflex to Microscopic and Culture - Clinc Collect     CBC with platelets     Basic metabolic panel     Nursing Communication 1     Lyme Disease Total Abs Bld with Reflex to Confirm CLIA     CBC with platelets     Basic metabolic panel     Lyme Disease Total Abs Bld with Reflex to Confirm CLIA   3. Essential hypertension, benign  I10        Patient Instructions   1. Continue with your current medications as you have been  2. Recommend getting a blood pressure cuff and monitoring her blood pressures once a day at home.  The timing of monitoring can vary.  Keep a log and bring it into your next primary care visit  3. You will be notified of your lab results when they are back.  4. Seek emergency medical attention if you are experiencing severe head pain, confusion, fainting, or focal weaknesses.      HPI:  Maribell Cabrera is a 57 year old female with past medical history of hypertension, hypercholesterolemia, vitamin D deficiency who presents today complaining of dizziness, fatigue, and mild nausea for the past 1-1/2 weeks. Feels like an off balance sensation; listing to one side. Symptoms are mild. No recent med changes.     History obtained from the patient.    Problem List:  2020-07: Nonintractable headache, unspecified chronicity pattern,   unspecified headache type  2014-12: Seasonal affective disorder (H)  Essential Hypercholesterolemia  Carpal Tunnel Syndrome  Benign Essential Hypertension  Allergic Rhinitis  Hallux Valgus  Vitamin D Deficiency      No past medical history on file.    Social History     Tobacco Use     Smoking status: Never Smoker     Smokeless tobacco: Never Used   Substance Use Topics     Alcohol use: Yes     Comment: Alcoholic Drinks/day: Not much!       Review of Systems    Constitutional: Positive for fatigue. Negative for chills and fever.   HENT: Negative for congestion and ear pain.    Eyes: Negative for visual disturbance.   Respiratory: Negative for cough and shortness of breath.    Cardiovascular: Negative for chest pain.   Gastrointestinal: Positive for nausea. Negative for vomiting.   Allergic/Immunologic: Positive for environmental allergies.   Neurological: Positive for dizziness (imbalance, listing to one side. No severe symtoms). Negative for syncope and headaches.       Vitals:    09/27/22 1141 09/27/22 1238   BP: (!) 152/79    BP Location: Right arm    Patient Position: Sitting Supine   Cuff Size: Adult Regular    Pulse: 62    Resp: 20    Temp: 98.2  F (36.8  C)    TempSrc: Oral    SpO2: 100%    Weight: 57.5 kg (126 lb 11.2 oz)        Physical Exam  Vitals and nursing note reviewed.   Constitutional:       General: She is not in acute distress.     Appearance: She is not toxic-appearing or diaphoretic.   HENT:      Head: Normocephalic and atraumatic.      Right Ear: Tympanic membrane, ear canal and external ear normal.      Left Ear: Tympanic membrane, ear canal and external ear normal.   Eyes:      Extraocular Movements: Extraocular movements intact.      Conjunctiva/sclera: Conjunctivae normal.      Pupils: Pupils are equal, round, and reactive to light.   Cardiovascular:      Rate and Rhythm: Normal rate and regular rhythm.      Heart sounds: No murmur heard.  Pulmonary:      Effort: Pulmonary effort is normal. No respiratory distress.      Breath sounds: No stridor. No wheezing, rhonchi or rales.   Neurological:      Mental Status: She is alert.      GCS: GCS eye subscore is 4. GCS verbal subscore is 5. GCS motor subscore is 6.      Cranial Nerves: Cranial nerves 2-12 are intact. No facial asymmetry.      Motor: Motor function is intact. No weakness, tremor or atrophy.      Coordination: Coordination is intact. Romberg sign negative. Coordination normal.  Finger-Nose-Finger Test normal.      Gait: Gait is intact. Gait and tandem walk normal.   Psychiatric:         Mood and Affect: Mood normal.         Behavior: Behavior normal.         Thought Content: Thought content normal.         Judgment: Judgment normal.         Results:  I have personally ordered and preliminarily reviewed the following EKG, and note no arrhythmias or signs of ischemia.  No signs of any severe dehydration.    Results for orders placed or performed in visit on 09/27/22   UA macro with reflex to Microscopic and Culture - Clinc Collect     Status: Normal    Specimen: Urine, Clean Catch   Result Value Ref Range    Color Urine Yellow Colorless, Straw, Light Yellow, Yellow    Appearance Urine Clear Clear    Glucose Urine Negative Negative mg/dL    Bilirubin Urine Negative Negative    Ketones Urine Negative Negative mg/dL    Specific Gravity Urine 1.015 1.005 - 1.030    Blood Urine Negative Negative    pH Urine 7.0 5.0 - 8.0    Protein Albumin Urine Negative Negative mg/dL    Urobilinogen Urine 0.2 0.2, 1.0 E.U./dL    Nitrite Urine Negative Negative    Leukocyte Esterase Urine Negative Negative    Narrative    Microscopic not indicated   EKG 12-lead, tracing only     Status: None (Preliminary result)   Result Value Ref Range    Systolic Blood Pressure  mmHg    Diastolic Blood Pressure  mmHg    Ventricular Rate 59 BPM    Atrial Rate 59 BPM    AL Interval 114 ms    QRS Duration 82 ms     ms    QTc 445 ms    P Axis 22 degrees    R AXIS 27 degrees    T Axis 38 degrees    Interpretation ECG       Sinus bradycardia  Otherwise normal ECG  When compared with ECG of 19-APR-2021 14:23,  No significant change was found           At the end of the encounter, I discussed results, diagnosis, medications. Discussed red flags for immediate return to clinic/ER, as well as indications for follow up if no improvement. Patient understood and agreed to plan. Patient was stable for discharge.    40 minutes spent on  the date of the encounter doing chart review, history and examination, documentation, and further activities as noted.

## 2022-09-28 LAB — B BURGDOR IGG+IGM SER QL: 0.07

## 2022-10-01 ENCOUNTER — HEALTH MAINTENANCE LETTER (OUTPATIENT)
Age: 57
End: 2022-10-01

## 2022-10-07 ENCOUNTER — OFFICE VISIT (OUTPATIENT)
Dept: FAMILY MEDICINE | Facility: CLINIC | Age: 57
End: 2022-10-07
Payer: COMMERCIAL

## 2022-10-07 VITALS
OXYGEN SATURATION: 100 % | BODY MASS INDEX: 20.66 KG/M2 | DIASTOLIC BLOOD PRESSURE: 80 MMHG | WEIGHT: 124 LBS | SYSTOLIC BLOOD PRESSURE: 146 MMHG | HEIGHT: 65 IN | TEMPERATURE: 97.1 F | HEART RATE: 58 BPM

## 2022-10-07 DIAGNOSIS — Z63.6 CAREGIVER STRESS: ICD-10-CM

## 2022-10-07 DIAGNOSIS — N95.1 VASOMOTOR SYMPTOMS DUE TO MENOPAUSE: ICD-10-CM

## 2022-10-07 DIAGNOSIS — I10 ESSENTIAL HYPERTENSION, BENIGN: ICD-10-CM

## 2022-10-07 DIAGNOSIS — Z00.00 ENCOUNTER FOR PREVENTATIVE ADULT HEALTH CARE EXAMINATION: Primary | ICD-10-CM

## 2022-10-07 LAB
CHOLEST SERPL-MCNC: 255 MG/DL
HDLC SERPL-MCNC: 92 MG/DL
LDLC SERPL CALC-MCNC: 145 MG/DL
NONHDLC SERPL-MCNC: 163 MG/DL
TRIGL SERPL-MCNC: 88 MG/DL

## 2022-10-07 PROCEDURE — 80061 LIPID PANEL: CPT | Performed by: FAMILY MEDICINE

## 2022-10-07 PROCEDURE — 99396 PREV VISIT EST AGE 40-64: CPT | Mod: 25 | Performed by: FAMILY MEDICINE

## 2022-10-07 PROCEDURE — 91313 COVID-19,PF,MODERNA BIVALENT: CPT | Performed by: FAMILY MEDICINE

## 2022-10-07 PROCEDURE — 90471 IMMUNIZATION ADMIN: CPT | Performed by: FAMILY MEDICINE

## 2022-10-07 PROCEDURE — 0134A COVID-19,PF,MODERNA BIVALENT: CPT | Performed by: FAMILY MEDICINE

## 2022-10-07 PROCEDURE — 99213 OFFICE O/P EST LOW 20 MIN: CPT | Mod: 25 | Performed by: FAMILY MEDICINE

## 2022-10-07 PROCEDURE — 90682 RIV4 VACC RECOMBINANT DNA IM: CPT | Performed by: FAMILY MEDICINE

## 2022-10-07 PROCEDURE — 36415 COLL VENOUS BLD VENIPUNCTURE: CPT | Performed by: FAMILY MEDICINE

## 2022-10-07 RX ORDER — LISINOPRIL/HYDROCHLOROTHIAZIDE 10-12.5 MG
1 TABLET ORAL DAILY
Qty: 30 TABLET | Refills: 0 | Status: SHIPPED | OUTPATIENT
Start: 2022-10-07 | End: 2022-11-04

## 2022-10-07 RX ORDER — ESCITALOPRAM OXALATE 5 MG/1
TABLET ORAL
Qty: 53 TABLET | Refills: 0 | Status: SHIPPED | OUTPATIENT
Start: 2022-10-07 | End: 2022-11-04

## 2022-10-07 SDOH — SOCIAL STABILITY - SOCIAL INSECURITY: DEPENDENT RELATIVE NEEDING CARE AT HOME: Z63.6

## 2022-10-07 ASSESSMENT — ENCOUNTER SYMPTOMS
SHORTNESS OF BREATH: 0
WEAKNESS: 0
DIZZINESS: 0
EYE PAIN: 0
HEMATURIA: 0
ARTHRALGIAS: 0
SORE THROAT: 0
PALPITATIONS: 0
CONSTIPATION: 0
PARESTHESIAS: 0
NAUSEA: 0
HEARTBURN: 0
ABDOMINAL PAIN: 0
DYSURIA: 0
BREAST MASS: 0
COUGH: 0
JOINT SWELLING: 0
NERVOUS/ANXIOUS: 1
FREQUENCY: 0
MYALGIAS: 0
DIARRHEA: 0
HEMATOCHEZIA: 0
HEADACHES: 0
CHILLS: 0
FEVER: 0

## 2022-10-07 ASSESSMENT — PAIN SCALES - GENERAL: PAINLEVEL: NO PAIN (0)

## 2022-10-07 NOTE — PROGRESS NOTES
SUBJECTIVE:   CC: Maribell is an 57 year old who presents for preventive health visit. Patient is looking to get flu  And Moderna booster    Menopausal Symptoms  -Reported insomnia and hot flashes with onset of menopause.   -Stress reduction, yoga exercises typically help but she has been unable to do it recently.   -Having those since age of 40 (was night sweats), went through menopause and had full on hot flashes (50)  -Insomnia could be multifactorial given home situation  -Getting up to urinate once during the night as well.   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   -Patient's mother with a stroke with residual speech aphasia, complicated by pre existing cognitive impairment.   -She is among several sisters, but is the only one in the area.   -Was also care giver for her father before he  [id number]. But has been helping out since .   -Patient has been able to eat, take care of herself. She did notice she is not exercising as much.     Dizziness  -Underwent extensive testing but no remarkable condition found  -Improved when treating allergies with loratadine.    Patient has been advised of split billing requirements and indicates understanding: Yes     Healthy Habits:     Getting at least 3 servings of Calcium per day:  Yes    Bi-annual eye exam:  Yes    Dental care twice a year:  Yes    Sleep apnea or symptoms of sleep apnea:  Daytime drowsiness    Diet:  Regular (no restrictions)    Frequency of exercise:  4-5 days/week    Duration of exercise:  30-45 minutes    Taking medications regularly:  Yes    Medication side effects:  None    PHQ-2 Total Score: 2    Additional concerns today:  Yes      Today's PHQ-2 Score:   PHQ-2 (  [employer]) 10/7/2022   Q1: Little interest or pleasure in doing things 1   Q2: Feeling down, depressed or hopeless 1   PHQ-2 Score 2   Q1: Little interest or pleasure  in doing things Several days   Q2: Feeling down, depressed or hopeless Several days   PHQ-2 Score 2     Abuse: Current or Past (Physical, Sexual or Emotional) - No  Do you feel safe in your environment? Yes    Social History     Tobacco Use     Smoking status: Never Smoker     Smokeless tobacco: Never Used   Substance Use Topics     Alcohol use: Yes     Comment: Alcoholic Drinks/day: Not much!     If you drink alcohol do you typically have >3 drinks per day or >7 drinks per week? No    Alcohol Use 10/7/2022   Prescreen: >3 drinks/day or >7 drinks/week? No   Prescreen: >3 drinks/day or >7 drinks/week? -   No flowsheet data found.    Reviewed orders with patient.  Reviewed health maintenance and updated orders accordingly - Yes  Lab work is in process    Breast Cancer Screening: But two family members with history of breast cancer  Any new diagnosis of family breast, ovarian, or bowel cancer? No    FHS-7:   Breast CA Risk Assessment (FHS-7) 8/4/2021 8/11/2022 10/7/2022   Did any of your first-degree relatives have breast or ovarian cancer? Yes Yes Yes   Did any of your relatives have bilateral breast cancer? No No No   Did any man in your family have breast cancer? - No No   Did any woman in your family have breast and ovarian cancer? No No Yes   Did any woman in your family have breast cancer before age 50 y? No No No   Do you have 2 or more relatives with breast and/or ovarian cancer? Yes No Yes   Do you have 2 or more relatives with breast and/or bowel cancer? Yes No Yes     click delete button to remove this line now  Mammogram Screening: Recommended mammography every 1-2 years with patient discussion and risk factor consideration  Pertinent mammograms are reviewed under the imaging tab.    History of abnormal Pap smear: NO - age 30-65 PAP every 5 years with negative HPV co-testing recommended  PAP / HPV Latest Ref Rng & Units 6/3/2019 12/29/2014   PAP Negative for squamous intraepithelial lesion or malignancy.  "Negative for squamous intraepithelial lesion or malignancy  Electronically signed by Genaro Saenz CT (ASCP) on 6/12/2019 at  4:11 PM   Negative for squamous intraepithelial lesion or malignancy  Electronically signed by Adele Howell CT (ASCP) on 1/7/2015 at 12:59 PM     HPV16 NEG Negative -   HPV18 NEG Negative -   HRHPV NEG Negative -     Reviewed and updated as needed this visit by clinical staff   Tobacco  Allergies    Med Hx  Surg Hx  Fam Hx  Soc Hx          Reviewed and updated as needed this visit by Provider                   History reviewed. No pertinent past medical history.   Past Surgical History:   Procedure Laterality Date     MOUTH SURGERY         Review of Systems   Constitutional: Negative for chills and fever.   HENT: Positive for hearing loss. Negative for congestion, ear pain and sore throat.    Eyes: Negative for pain and visual disturbance.   Respiratory: Negative for cough and shortness of breath.    Cardiovascular: Negative for chest pain, palpitations and peripheral edema.   Gastrointestinal: Negative for abdominal pain, constipation, diarrhea, heartburn, hematochezia and nausea.   Breasts:  Negative for tenderness, breast mass and discharge.   Genitourinary: Negative for dysuria, frequency, genital sores, hematuria, pelvic pain, urgency, vaginal bleeding and vaginal discharge.   Musculoskeletal: Negative for arthralgias, joint swelling and myalgias.   Skin: Negative for rash.   Neurological: Negative for dizziness, weakness, headaches and paresthesias.   Psychiatric/Behavioral: Negative for mood changes. The patient is nervous/anxious.       OBJECTIVE:   BP (!) 146/80 (BP Location: Right arm, Patient Position: Sitting, Cuff Size: Adult Regular)   Pulse 58   Temp 97.1  F (36.2  C) (Temporal)   Ht 1.651 m (5' 5\")   Wt 56.2 kg (124 lb)   LMP  (LMP Unknown)   SpO2 100%   Breastfeeding No   BMI 20.63 kg/m    Physical Exam  GENERAL: healthy, alert and no " distress  EYES: Eyes grossly normal to inspection, PERRL and conjunctivae and sclerae normal  HENT: ear canals and TM's normal, nose and mouth without ulcers or lesions  NECK: no adenopathy, no asymmetry, masses, or scars and thyroid normal to palpation  RESP: lungs clear to auscultation - no rales, rhonchi or wheezes  CV: regular rate and rhythm, normal S1 S2, no S3 or S4, no murmur, click or rub, no peripheral edema and peripheral pulses strong  ABDOMEN: soft, nontender, no hepatosplenomegaly, no masses and bowel sounds normal  MS: no gross musculoskeletal defects noted, no edema  SKIN: no suspicious lesions or rashes  NEURO: Normal strength and tone, mentation intact and speech normal  PSYCH: mentation appears normal, affect normal/bright    Diagnostic Test Results:  Labs reviewed in Epic  No results found for this or any previous visit (from the past 24 hour(s)).    ASSESSMENT/PLAN:   Maribell was seen today for physical.    Diagnoses and all orders for this visit:    Encounter for preventative adult health care examination  Comments:  Discussed expected prescription lab screenings for patient at this visit.  COVID and flu vaccine ordered.  Otherwise all screening is up-to-date.  Patient does not have any risk factors to indicate DEXA scan needed until age 65+.   Orders:  -     REVIEW OF HEALTH MAINTENANCE PROTOCOL ORDERS  -     INFLUENZA QUAD, RECOMBINANT, P-FREE (RIV4) (FLUBLOK) AGE 50-64 [YJS810]  -     Lipid Profile (Chol, Trig, HDL, LDL calc); Future  -     Lipid Profile (Chol, Trig, HDL, LDL calc)    Essential hypertension, benign  Comments:  Elevated on exam today.  Will reassess at next visit for medication adjustment.  Patient with recent EKG does not appear to have a recent urine protein creatinine ratio.  It is reassuring that her UA does not show any overt proteinuria.  Orders:  -     lisinopril-hydrochlorothiazide (ZESTORETIC) 10-12.5 MG tablet; Take 1 tablet by mouth daily    Caregiver  "stress  Comments:  Primary caregiver for her mother.  Is able to make appointments and have time to cook and take care of herself.  However her mother's condition is worsening.  Declined any resources for support groups or therapy at this time.     Vasomotor symptoms due to menopause  Comments:  Worsened since prior visit.  Trialed gabapentin and Cymbalta with little improvement.  Unable to take hormone replacement therapy due to family history of breast cancer.  We will do a slow taper increase, and reassess in 1 month.  Orders:  -     escitalopram (LEXAPRO) 5 MG tablet; Take 1 tablet (5 mg) by mouth daily for 7 days, THEN 2 tablets (10 mg) daily for 23 days.    Other orders  -     COVID-19,PF,MODERNA BIVALENT 18+Yrs  Patient has been advised of split billing requirements and indicates understanding: Yes    COUNSELING:  Special attention given to:        Regular exercise       Healthy diet/nutrition       Hearing screening       (Yuridia)menopause management    Estimated body mass index is 20.63 kg/m  as calculated from the following:    Height as of this encounter: 1.651 m (5' 5\").    Weight as of this encounter: 56.2 kg (124 lb).        She reports that she has never smoked. She has never used smokeless tobacco.      Dhara Bowman DO  [facility]  "

## 2022-11-04 ENCOUNTER — OFFICE VISIT (OUTPATIENT)
Dept: FAMILY MEDICINE | Facility: CLINIC | Age: 57
End: 2022-11-04
Payer: COMMERCIAL

## 2022-11-04 VITALS
HEART RATE: 72 BPM | DIASTOLIC BLOOD PRESSURE: 81 MMHG | SYSTOLIC BLOOD PRESSURE: 150 MMHG | TEMPERATURE: 98.4 F | OXYGEN SATURATION: 99 %

## 2022-11-04 DIAGNOSIS — G47.62 NOCTURNAL LEG CRAMPS: ICD-10-CM

## 2022-11-04 DIAGNOSIS — R79.89 HIGH SERUM LOW-DENSITY LIPOPROTEIN (LDL): ICD-10-CM

## 2022-11-04 DIAGNOSIS — I10 ESSENTIAL HYPERTENSION, BENIGN: Primary | ICD-10-CM

## 2022-11-04 PROCEDURE — 99213 OFFICE O/P EST LOW 20 MIN: CPT | Performed by: FAMILY MEDICINE

## 2022-11-04 RX ORDER — LISINOPRIL AND HYDROCHLOROTHIAZIDE 12.5; 2 MG/1; MG/1
1 TABLET ORAL DAILY
Qty: 90 TABLET | Refills: 0 | Status: SHIPPED | OUTPATIENT
Start: 2022-11-04 | End: 2023-01-13

## 2022-11-04 ASSESSMENT — PAIN SCALES - GENERAL: PAINLEVEL: NO PAIN (0)

## 2022-11-04 NOTE — PATIENT INSTRUCTIONS
Follow up with me for blood pressure in 5-6 weeks whenever you've been on the medication for one month.  If you experience more muscle cramping at night, please switch back to your old dose and let me know.  Your 10 year risk for developing heart disease or stroke is 3.7%, we do not treat with a statin until it's closer to 7%.

## 2022-11-04 NOTE — PROGRESS NOTES
Assessment & Plan     Benign Essential Hypertension  Chronic, not at goal.  Patient does not take blood pressure at home, is asymptomatic otherwise.  Given two back-to-back high blood pressure readings, will treat to blood pressure goal of less than 130/80.  Given patient's sensitivity to medications, patient would prefer to maximize dose of lisinopril, instead of adding a third agent.  Medication as below, come back in 1 month to assess blood pressure and will need metabolic panel.  - lisinopril-hydrochlorothiazide (ZESTORETIC) 20-12.5 MG tablet  Dispense: 90 tablet; Refill: 0    Nocturnal leg cramps  Acute, new.  Provided reassurance that this is overall benign.  Patient with sister recently diagnosed with peripheral artery disease.  Patient does not have any symptoms of claudication, or any distal numbness or tingling.  Patient's symptoms mostly occur at night, and improved with hydration.  Recommended avoiding caffeine, increasing food with electrolytes.    High serum low-density lipoprotein (LDL)  Chronic, stable.  Patient with elevated LDL, however HDL is excellent.  When looking at ASCVD risk score, she is at 3.7%.  We will continue to observe.  Reviewed labs with patient today.      Review of the result(s) of each unique test - lipid panel  Prescription drug management  I spent a total of 20 minutes on the day of the visit.   Time spent doing chart review, history and exam, documentation and further activities per the note       Orders Placed This Encounter   Medications     lisinopril-hydrochlorothiazide (ZESTORETIC) 20-12.5 MG tablet     Sig: Take 1 tablet by mouth daily     Dispense:  90 tablet     Refill:  0       Return in about 6 weeks (around 12/16/2022) for Follow up HTN adjustment.    Dhara Bowman DO  [facility] ServicesMELODYSharon Grove    Mitra Reyna is a 57 year old, presenting for the following health issues:  RECHECK (Follow up on hot flash treatment and high cholesterol. ) and Leg  Problem (Cramping in left leg.)      History of Present Illness       Reason for visit:  Follow up ith hot flash treatmentand high choleaterol    She eats 4 or more servings of fruits and vegetables daily.She consumes 0 sweetened beverage(s) daily.She exercises with enough effort to increase her heart rate 20 to 29 minutes per day.  She exercises with enough effort to increase her heart rate 4 days per week.   She is taking medications regularly.     Vasomotor Symptoms  -Patient took lexapro and had some nausea, racing heart beat, heart burn sensation from taking the lexapro.  -Patient also with muscle twitching.  -Patient unable to tell if it helped with symptoms.    Nasocort  -Stopped taking claritin, stopped the nasocort after a week and a half. She still had dry mouth.  -Patient still having dry mouth despite stopping several days    Hot flashes  -Patient will be trying black kohosh    Leg Cramps  -Across the anterior just superior to the knee  -Does not worsen with activity  -Usually occurs at night  -No numbness/tingling. Some weakness with it, improved with hydration.   -Ongoing for thirty days  -No erythema or palpable knot     Review of Systems         Objective    BP (!) 150/81 (BP Location: Right arm, Patient Position: Left side, Cuff Size: Adult Regular)   Pulse 72   Temp 98.4  F (36.9  C) (Oral)   LMP  (LMP Unknown)   SpO2 99%   Breastfeeding No   There is no height or weight on file to calculate BMI.  Physical Exam   GENERAL: healthy, alert and no distress  EYES: Eyes grossly normal to inspection, PERRL and conjunctivae and sclerae normal  MS: no gross musculoskeletal defects noted, no edema. No tenderness over LE medial and lateral joint lines, no patellar laxity noted, strength intact bilaterally. Sensation in tact bilaterally.   SKIN: no suspicious lesions or rashes.  PSYCH: mentation appears normal, affect normal/bright

## 2023-01-13 ENCOUNTER — OFFICE VISIT (OUTPATIENT)
Dept: FAMILY MEDICINE | Facility: CLINIC | Age: 58
End: 2023-01-13
Payer: COMMERCIAL

## 2023-01-13 VITALS
HEART RATE: 54 BPM | OXYGEN SATURATION: 97 % | DIASTOLIC BLOOD PRESSURE: 75 MMHG | HEIGHT: 65 IN | BODY MASS INDEX: 21.67 KG/M2 | WEIGHT: 130.1 LBS | SYSTOLIC BLOOD PRESSURE: 126 MMHG | TEMPERATURE: 97.9 F

## 2023-01-13 DIAGNOSIS — Z63.8 CAREGIVER ROLE STRAIN: ICD-10-CM

## 2023-01-13 DIAGNOSIS — I10 ESSENTIAL HYPERTENSION: Primary | ICD-10-CM

## 2023-01-13 DIAGNOSIS — I10 ESSENTIAL HYPERTENSION, BENIGN: ICD-10-CM

## 2023-01-13 DIAGNOSIS — G47.09 OTHER INSOMNIA: ICD-10-CM

## 2023-01-13 LAB
ANION GAP SERPL CALCULATED.3IONS-SCNC: 13 MMOL/L (ref 7–15)
BUN SERPL-MCNC: 13.9 MG/DL (ref 6–20)
CALCIUM SERPL-MCNC: 9.9 MG/DL (ref 8.6–10)
CHLORIDE SERPL-SCNC: 97 MMOL/L (ref 98–107)
CREAT SERPL-MCNC: 0.69 MG/DL (ref 0.51–0.95)
DEPRECATED HCO3 PLAS-SCNC: 26 MMOL/L (ref 22–29)
GFR SERPL CREATININE-BSD FRML MDRD: >90 ML/MIN/1.73M2
GLUCOSE SERPL-MCNC: 99 MG/DL (ref 70–99)
POTASSIUM SERPL-SCNC: 4.1 MMOL/L (ref 3.4–5.3)
SODIUM SERPL-SCNC: 136 MMOL/L (ref 136–145)

## 2023-01-13 PROCEDURE — 36415 COLL VENOUS BLD VENIPUNCTURE: CPT | Performed by: FAMILY MEDICINE

## 2023-01-13 PROCEDURE — 80048 BASIC METABOLIC PNL TOTAL CA: CPT | Performed by: FAMILY MEDICINE

## 2023-01-13 PROCEDURE — 99214 OFFICE O/P EST MOD 30 MIN: CPT | Performed by: FAMILY MEDICINE

## 2023-01-13 RX ORDER — LISINOPRIL AND HYDROCHLOROTHIAZIDE 12.5; 2 MG/1; MG/1
1 TABLET ORAL DAILY
Qty: 90 TABLET | Refills: 3 | Status: SHIPPED | OUTPATIENT
Start: 2023-01-13 | End: 2023-05-11

## 2023-01-13 RX ORDER — VITAMIN B COMPLEX
1 TABLET ORAL DAILY
COMMUNITY

## 2023-01-13 SDOH — SOCIAL STABILITY - SOCIAL INSECURITY: OTHER SPECIFIED PROBLEMS RELATED TO PRIMARY SUPPORT GROUP: Z63.8

## 2023-01-13 ASSESSMENT — PAIN SCALES - GENERAL: PAINLEVEL: NO PAIN (0)

## 2023-01-13 NOTE — PATIENT INSTRUCTIONS
We will renew the blood pressure medication for a year! Yay!  We will recheck your electrolytes with a blood test, which I will reach out through [employer]   Someone will get in touch with you about support for caregiver groups.   Please reach out if the insomnia worsens.

## 2023-01-13 NOTE — PROGRESS NOTES
Assessment & Plan     Essential hypertension  Chronic, improved. Doing well on current medication regimen. No side effects currently noted. Did report high pressures at home, however unclear of cuff accuracy. Recommending patient bring to clinic for comparison if it continues to be high. Refills otherwise given, recheck metabolic panel to assess K, Scr.   - Basic metabolic panel  (Ca, Cl, CO2, Creat, Gluc, K, Na, BUN)  - lisinopril-hydrochlorothiazide (ZESTORETIC) 20-12.5 MG tablet  Dispense: 90 tablet; Refill: 3  - Basic metabolic panel  (Ca, Cl, CO2, Creat, Gluc, K, Na, BUN)    Other insomnia  Chronic, stable. Discussed various types of therapy to assist, the most effective being CBT-I. Patient declines referral, will do lifestyle changes.     Caregiver role strain  Chronic, worsening. Reporting more functional decline in mother. Offered emotional support, but recommending care coordination referral to reduce risk of caregiver burnout.   - Primary Care - Care Coordination Referral      Ordering of each unique test  Prescription drug management  I spent a total of 20 minutes on the day of the visit.   Time spent doing chart review, history and exam, documentation and further activities per the note  =  Return in about 1 year (around 1/13/2024) for Routine preventive, with me. In six months- message me your BP numbers.    Dhara Bowman DO  [employer]    Mitra Reyna is a 57 year old, presenting for the following health issues:  Hypertension (6 week follow up)    Pt is here for a follow up on blood pressure. She states that she is still on a high salt diet because she is cleaning out her freezer.     History of Present Illness       Hypertension: She presents for follow up of hypertension.  She does not check blood pressure  regularly outside of the clinic. Outside blood pressures have been over 140/90. She does not follow a low salt diet.           How many servings of fruits and  "vegetables do you eat daily?  4 or more    On average, how many sweetened beverages do you drink each day (Examples: soda, juice, sweet tea, etc.  Do NOT count diet or artificially sweetened beverages)?   0    How many days per week do you exercise enough to make your heart beat faster? 3 or less    How many minutes a day do you exercise enough to make your heart beat faster? 20 - 29    How many days per week do you miss taking your medication? 0     Patient did notice some weakness, with the beginning of the blood pressure regimen. The sleep quality is still low, but cramping improved. Down to a cup and a half of caffeine. Patient has trouble falling asleep and with sleep latency. Patient tried melatonin years ago with no effect.       Review of Systems   Constitutional, HEENT, cardiovascular, pulmonary, gi and gu systems are negative, except as otherwise noted.      Objective    /75 (BP Location: Right arm, Patient Position: Sitting, Cuff Size: Adult Regular)   Pulse 54   Temp 97.9  F (36.6  C) (Oral)   Ht 1.651 m (5' 5\")   Wt 59 kg (130 lb 1.6 oz)   LMP  (LMP Unknown)   SpO2 97%   BMI 21.65 kg/m    Body mass index is 21.65 kg/m .  Physical Exam   GENERAL: healthy, alert and no distress  EYES: Eyes grossly normal to inspection, PERRL and conjunctivae and sclerae normal  MS: no gross musculoskeletal defects noted, no edema  SKIN: no suspicious lesions or rashes  PSYCH: mentation appears normal, affect normal/bright    No results found for this or any previous visit (from the past 24 hour(s)).              "

## 2023-01-16 ENCOUNTER — PATIENT OUTREACH (OUTPATIENT)
Dept: CARE COORDINATION | Facility: CLINIC | Age: 58
End: 2023-01-16

## 2023-01-16 NOTE — LETTER
[facility] CARE COORDINATION  [facility]   January 17, 2023    Maribell Cabrera  [address] [place] [address]      Dear Maribell,        I am a clinic care coordinator who works with Ale Thomson CNP with the [facility]. I have been trying to reach you recently to introduce Clinic Care Coordination. Below is a description of clinic care coordination and how I can further assist you.       The clinic care coordination team is made up of a registered nurse, , financial resource worker and community health worker who understand the health care system. The goal of clinic care coordination is to help you manage your health and improve access to the health care system. Our team works alongside your provider to assist you in determining your health and social needs. We can help you obtain health care and community resources, providing you with necessary information and education. We can work with you through any barriers and develop a care plan that helps coordinate and strengthen the communication between you and your care team.    Please feel free to contact me with any questions or concerns regarding care coordination and what we can offer.      We are focused on providing you with the highest-quality healthcare experience possible.    Sincerely,     Tila Valencia  Community Health Worker   [facility] Care Coordination  [facility] & [facility]   [email or web address]  Office: [phone]

## 2023-01-16 NOTE — RESULT ENCOUNTER NOTE
Dear Maribell,     Here are your recent results which are within the expected range. There is no changes from adding the medication. We will continue to keep an eye on your blood pressure, but you should not be due for blood work until your annual check up. Please continue with your current plan of care and let us know if you have any questions or concerns.    Regards,  Dhara Bowman, DO

## 2023-01-17 NOTE — PROGRESS NOTES
Clinic Care Coordination Contact  [facility]/Voicemail     Clinical Data: Care Coordinator Outreach  Outreach attempted x 2. Left message on patient's voicemail with call back information and requested return call.    Plan: Care Coordinator will send care coordination introduction letter with care coordinator contact information and explanation of care coordination services via [employer]. Care Coordinator will do no further outreaches at this time.      Tila Valencia  Community Health Worker   [facility] Care Coordination  [facility]   [email or web address]  Office: [phone]        
Clinic Care Coordination Contact  [facility]/Voicemail     Clinical Data: Care Coordinator Outreach  Outreach attempted x 1. Left message on patient's voicemail with call back information and requested return call.    Plan: Care Coordinator will try to reach patient again in 1-2 business days or on 1/17/23.      Tila Valencia  Community Health Worker   [facility] Care Coordination  [facility] & [facility]   [email or web address]  Office: [phone]        
No bruits; no thyromegaly or nodules

## 2023-05-11 DIAGNOSIS — I10 ESSENTIAL HYPERTENSION, BENIGN: ICD-10-CM

## 2023-05-11 DIAGNOSIS — I10 ESSENTIAL HYPERTENSION: ICD-10-CM

## 2023-05-11 RX ORDER — LISINOPRIL AND HYDROCHLOROTHIAZIDE 12.5; 2 MG/1; MG/1
1 TABLET ORAL DAILY
Qty: 90 TABLET | Refills: 3 | Status: SHIPPED | OUTPATIENT
Start: 2023-05-11 | End: 2024-06-18

## 2023-05-11 NOTE — TELEPHONE ENCOUNTER
General Call    Contacts       Type Contact Phone/Fax    05/11/2023 09:29 AM CDT Phone (Incoming) Maribell Cabrera (Self) [phone] (H)        Reason for Call:  Pharmacy Change    What are your questions or concerns:    Lisinopril-hydrochlorothiazie RX    Requesting RX is transferred to [facility] in [place]    Date of last appointment with provider:     01/13/2023    Establish Care Appt completed on 10/07/2022 - Epic does not reflect Dr. Bowman as PCP

## 2023-08-14 ENCOUNTER — ANCILLARY PROCEDURE (OUTPATIENT)
Dept: MAMMOGRAPHY | Facility: CLINIC | Age: 58
End: 2023-08-14
Attending: FAMILY MEDICINE
Payer: COMMERCIAL

## 2023-08-14 DIAGNOSIS — Z12.31 VISIT FOR SCREENING MAMMOGRAM: ICD-10-CM

## 2023-08-14 PROCEDURE — 77067 SCR MAMMO BI INCL CAD: CPT | Mod: TC | Performed by: RADIOLOGY

## 2023-08-14 PROCEDURE — 77063 BREAST TOMOSYNTHESIS BI: CPT | Mod: TC | Performed by: RADIOLOGY

## 2023-08-25 NOTE — RESULT ENCOUNTER NOTE
Hello -    Here are my comments about the recent results. Did you want to meet with a genetics counselor for breast cancer screening recommendations? I am happy to put in the referral. In terms of actual risk, it is related to if a first degree relative is diagnosed (mother, sister).     Please let us know if you have any questions or concerns.    Regards,  OTTONIEL HARTLEY, DO

## 2023-09-07 ENCOUNTER — PATIENT OUTREACH (OUTPATIENT)
Dept: CARE COORDINATION | Facility: CLINIC | Age: 58
End: 2023-09-07
Payer: COMMERCIAL

## 2023-09-21 ENCOUNTER — PATIENT OUTREACH (OUTPATIENT)
Dept: CARE COORDINATION | Facility: CLINIC | Age: 58
End: 2023-09-21
Payer: COMMERCIAL

## 2023-10-19 ENCOUNTER — TELEPHONE (OUTPATIENT)
Dept: FAMILY MEDICINE | Facility: CLINIC | Age: 58
End: 2023-10-19
Payer: COMMERCIAL

## 2023-10-19 DIAGNOSIS — I10 ESSENTIAL HYPERTENSION: Primary | ICD-10-CM

## 2023-10-19 NOTE — LETTER
October 24, 2023      Maribell Cabrera  [address] [place] [address]        Dear Maribell,       We attempted to reach you via phone regarding  your request to have your labs completed prior to your visit with Dr. Bowman. She has placed some orders of basic labs, but during the visit she more order additional blood work. You can schedule a lab only appointment via [employer] or call us at [phone].     Sincerely,        OTTONIEL HARTLEY, DO

## 2023-10-19 NOTE — TELEPHONE ENCOUNTER
FYI - Status Update    Who is Calling: patient    Update: She would like the labs put in so she can due before her appointment in Dec as they would be fasting.     Does caller want a call/response back: Yes     Could we send this information to you in [facility] or would you prefer to receive a phone call?:   Patient would prefer a phone call   Okay to leave a detailed message?: Yes at Home number on file [phone] (home)

## 2023-10-19 NOTE — TELEPHONE ENCOUNTER
Patient would like to complete labs prior to visit in December. Please advise on ordering labs for patient

## 2023-10-23 NOTE — TELEPHONE ENCOUNTER
Given high blood pressure, that's what I would recommend. Otherwise, I typically would not unless she had symptoms or specific requests.     OTTONIEL HARTLEY, DO

## 2023-10-23 NOTE — TELEPHONE ENCOUNTER
Left message for patient to call back. Please inform her that Dr. Bowman placed some orders, but suggested waiting until end of November early December. We may to do more at the visit

## 2023-10-23 NOTE — TELEPHONE ENCOUNTER
Can you please clarify. Are you with her coming in for labs previous to appointment? If so have you placed you those orders? Or would you like her to wait until the appointment.

## 2023-10-24 NOTE — TELEPHONE ENCOUNTER
Left message for patient to  call back. Please relay message:     placed orders for a urine cr/albumin ratio, BMP given we should monitor for her high blood pressure. Please have her do it in the next two months or so. The orders were placed. I am OK with her doing it, but sometimes patients want more labs that I typically do not run.       Letter sent via mail after multiple attempts

## 2023-12-22 ENCOUNTER — OFFICE VISIT (OUTPATIENT)
Dept: FAMILY MEDICINE | Facility: CLINIC | Age: 58
End: 2023-12-22
Payer: COMMERCIAL

## 2023-12-22 VITALS
OXYGEN SATURATION: 99 % | TEMPERATURE: 98 F | DIASTOLIC BLOOD PRESSURE: 77 MMHG | BODY MASS INDEX: 21.99 KG/M2 | RESPIRATION RATE: 20 BRPM | SYSTOLIC BLOOD PRESSURE: 144 MMHG | WEIGHT: 132 LBS | HEART RATE: 57 BPM | HEIGHT: 65 IN

## 2023-12-22 DIAGNOSIS — E78.00 PURE HYPERCHOLESTEROLEMIA: ICD-10-CM

## 2023-12-22 DIAGNOSIS — Z00.00 ROUTINE GENERAL MEDICAL EXAMINATION AT A HEALTH CARE FACILITY: Primary | ICD-10-CM

## 2023-12-22 DIAGNOSIS — G47.00 INSOMNIA, UNSPECIFIED TYPE: ICD-10-CM

## 2023-12-22 DIAGNOSIS — J30.9 ALLERGIC SINUSITIS: ICD-10-CM

## 2023-12-22 DIAGNOSIS — I10 ESSENTIAL HYPERTENSION, BENIGN: ICD-10-CM

## 2023-12-22 DIAGNOSIS — K21.00 GASTROESOPHAGEAL REFLUX DISEASE WITH ESOPHAGITIS WITHOUT HEMORRHAGE: ICD-10-CM

## 2023-12-22 PROBLEM — E55.9 VITAMIN D DEFICIENCY: Status: ACTIVE | Noted: 2023-12-22

## 2023-12-22 PROBLEM — G56.00 CARPAL TUNNEL SYNDROME: Status: ACTIVE | Noted: 2023-12-22

## 2023-12-22 PROBLEM — M20.10 HALLUX VALGUS: Status: ACTIVE | Noted: 2023-12-22

## 2023-12-22 PROCEDURE — 99396 PREV VISIT EST AGE 40-64: CPT | Mod: 25 | Performed by: FAMILY MEDICINE

## 2023-12-22 PROCEDURE — 99214 OFFICE O/P EST MOD 30 MIN: CPT | Mod: 25 | Performed by: FAMILY MEDICINE

## 2023-12-22 RX ORDER — FLUTICASONE PROPIONATE 50 MCG
1 SPRAY, SUSPENSION (ML) NASAL DAILY
Qty: 16 G | Refills: 0 | Status: SHIPPED | OUTPATIENT
Start: 2023-12-22

## 2023-12-22 ASSESSMENT — ENCOUNTER SYMPTOMS
ABDOMINAL PAIN: 1
COUGH: 0
DYSURIA: 0
EYE PAIN: 0
JOINT SWELLING: 0
HEMATURIA: 0
HEMATOCHEZIA: 0
ARTHRALGIAS: 1
MYALGIAS: 0
CHILLS: 0
SORE THROAT: 0
WEAKNESS: 1
NERVOUS/ANXIOUS: 1
DIARRHEA: 0
PARESTHESIAS: 0
CONSTIPATION: 0
HEARTBURN: 1
BREAST MASS: 0
FEVER: 0
NAUSEA: 1
DIZZINESS: 0
PALPITATIONS: 1
SHORTNESS OF BREATH: 0
HEADACHES: 0
FREQUENCY: 0

## 2023-12-22 ASSESSMENT — PAIN SCALES - GENERAL: PAINLEVEL: NO PAIN (1)

## 2023-12-22 NOTE — PATIENT INSTRUCTIONS
Take the FAMOTIDINE 40 mg (Pepcid AC) in the morning every day for several months. If the symptoms are not better after 1-2 months, we should refer you to gastroenterology.     Preventive Health Recommendations  Female Ages 50 - 64    Yearly exam: See your health care provider every year in order to  Review health changes.   Discuss preventive care.    Review your medicines if your doctor has prescribed any.    Get a Pap test every three years (unless you have an abnormal result and your provider advises testing more often).  If you get Pap tests with HPV test, you only need to test every 5 years, unless you have an abnormal result.   You do not need a Pap test if your uterus was removed (hysterectomy) and you have not had cancer.  You should be tested each year for STDs (sexually transmitted diseases) if you're at risk.   Have a mammogram every 1 to 2 years.  Have a colonoscopy at age 45, or have a yearly FIT test (stool test). These exams screen for colon cancer.    Have a cholesterol test every 5 years, or more often if advised.  Have a diabetes test (fasting glucose) every three years. If you are at risk for diabetes, you should have this test more often.   If you are at risk for osteoporosis (brittle bone disease), think about having a bone density scan (DEXA).    Shots: Get a flu shot each year. Get a tetanus shot every 10 years.    Nutrition:   Eat at least 5 servings of fruits and vegetables each day.  Eat whole-grain bread, whole-wheat pasta and brown rice instead of white grains and rice.  Get adequate Calcium and Vitamin D.     Lifestyle  Exercise at least 150 minutes a week (30 minutes a day, 5 days a week). This will help you control your weight and prevent disease.  Limit alcohol to one drink per day.  No smoking.   Wear sunscreen to prevent skin cancer.   See your dentist every six months for an exam and cleaning.  See your eye doctor every 1 to 2 years.

## 2023-12-22 NOTE — PROGRESS NOTES
SUBJECTIVE:   Maribell is a 58 year old, presenting for the following:  Physical, Gastrointestinal Problem (Issues are increasing.), Sinus Problem (Chronic sinus pain), Insomnia, and Joint Pain (Tips of elbows hurt.)        12/22/2023    11:58 AM   Additional Questions   Roomed by JOLIE Robertson   Accompanied by SUSIE       Healthy Habits:     Getting at least 3 servings of Calcium per day:  Yes    Bi-annual eye exam:  NO    Dental care twice a year:  Yes    Sleep apnea or symptoms of sleep apnea:  Daytime drowsiness    Diet:  Low salt    Frequency of exercise:  4-5 days/week    Duration of exercise:  30-45 minutes    Taking medications regularly:  Yes    Additional concerns today:  Yes      Gastrointestinal issues seem to be increasing: increased indigestion/feels like reflux, increased bloating, burping/some stomach pain and nausea/feeling weak in the legs. It waxes and wanes in intensity. Still occur despite lifestyle modifications. Patient does take tums as needed. It has a gradual onset and worsening for the last three weeks. First noticed several months ago. Denies regurgitation/vomiting. She did have one episode of dysphagia since then. No changes in stool or weight loss. There is a family history of GIST (gastrointestinal stromal tumor). She has been feeling more week in the knees.     Chronic sinus pain that has been going on for decades. It's been bothering her more recently. She would get ear aches as a child. She tried to use a neti pot however, the liquid will not drain on the contralateral side and backflow in the ipsilateral side. No nose bleeds, or difficulty breathing while asleep. History of seasonal allergies. In the summer, she will take claritin.     Insomnia- Patient stays up late. This has been ongoing for years.She reports that she will lay there awake. She does report increased stress. She does not feel well rested. She tries to get to bed at 09:30-10:00PM and is unsure of how long it takes to  fall asleep.      Joint pain-tips of elbows hurt. Will sometimes radiate down the volar surface of the right arm.Occurs on the left arm. Especially occurs with setting arm down on items.     Have you ever done Advance Care Planning? (For example, a Health Directive, POLST, or a discussion with a medical provider or your loved ones about your wishes): No, advance care planning information given to patient to review.  Patient plans to discuss their wishes with loved ones or provider.      Social History     Tobacco Use    Smoking status: Never    Smokeless tobacco: Never   Substance Use Topics    Alcohol use: Yes     Comment: Alcoholic Drinks/day: Not much!           12/22/2023    11:51 AM   Alcohol Use   Prescreen: >3 drinks/day or >7 drinks/week? No          No data to display              Reviewed orders with patient.  Reviewed health maintenance and updated orders accordingly - Yes  Lab work is in process    Breast Cancer Screening:    FHS-7:       8/4/2021     9:15 AM 8/11/2022     2:55 PM 10/7/2022    10:13 AM 8/14/2023     3:38 PM 12/22/2023    11:52 AM   Breast CA Risk Assessment (FHS-7)   Did any of your first-degree relatives have breast or ovarian cancer? Yes Yes Yes Yes Yes   Did any of your relatives have bilateral breast cancer? No No No No No   Did any man in your family have breast cancer?  No No No No   Did any woman in your family have breast and ovarian cancer? No No Yes No No   Did any woman in your family have breast cancer before age 50 y? No No No No No   Do you have 2 or more relatives with breast and/or ovarian cancer? Yes No Yes No Yes   Do you have 2 or more relatives with breast and/or bowel cancer? Yes No Yes No No       Mammogram Screening: Recommended mammography every 1-2 years with patient discussion and risk factor consideration  Pertinent mammograms are reviewed under the imaging tab.    History of abnormal Pap smear: NO - age 30-65 PAP every 5 years with negative HPV co-testing  recommended      Latest Ref Rng & Units 6/3/2019    12:20 PM 12/29/2014    10:54 AM   PAP / HPV   PAP Negative for squamous intraepithelial lesion or malignancy. Negative for squamous intraepithelial lesion or malignancy  Electronically signed by Genaro Saenz CT (ASCP) on 6/12/2019 at  4:11 PM    Negative for squamous intraepithelial lesion or malignancy  Electronically signed by Adele Howell CT (ASCP) on 1/7/2015 at 12:59 PM      HPV 16 DNA NEG Negative     HPV 18 DNA NEG Negative     Other HR HPV NEG Negative       Reviewed and updated as needed this visit by clinical staff   Tobacco  Allergies  Meds  Problems  Med Hx  Surg Hx  Fam Hx          Reviewed and updated as needed this visit by Provider   Tobacco  Allergies  Meds  Problems  Med Hx  Surg Hx  Fam Hx             Review of Systems   Constitutional:  Negative for chills and fever.   HENT:  Positive for congestion and hearing loss. Negative for ear pain and sore throat.    Eyes:  Negative for pain and visual disturbance.   Respiratory:  Negative for cough and shortness of breath.    Cardiovascular:  Positive for palpitations and peripheral edema. Negative for chest pain.   Gastrointestinal:  Positive for abdominal pain, heartburn and nausea. Negative for constipation, diarrhea and hematochezia.   Breasts:  Negative for tenderness, breast mass and discharge.   Genitourinary:  Negative for dysuria, frequency, genital sores, hematuria, pelvic pain, urgency, vaginal bleeding and vaginal discharge.   Musculoskeletal:  Positive for arthralgias. Negative for joint swelling and myalgias.   Skin:  Negative for rash.   Neurological:  Positive for weakness. Negative for dizziness, headaches and paresthesias.   Psychiatric/Behavioral:  Negative for mood changes. The patient is nervous/anxious.          OBJECTIVE:   BP (!) 144/77 (BP Location: Right arm, Patient Position: Sitting, Cuff Size: Adult Regular)   Pulse 57   Temp 98  F (36.7  " C) (Oral)   Resp 20   Ht 1.638 m (5' 4.5\")   Wt 59.9 kg (132 lb)   LMP  (LMP Unknown)   SpO2 99%   BMI 22.31 kg/m    Physical Exam  GENERAL: healthy, alert and no distress  EYES: Eyes grossly normal to inspection, PERRL and conjunctivae and sclerae normal  HENT: normal cephalic/atraumatic, ear canals and TM's normal, nose and mouth without ulcers or lesions, oropharynx with some erythema and cobblestoning  NECK: no adenopathy, no asymmetry, masses, or scars and thyroid normal to palpation  RESP: lungs clear to auscultation - no rales, rhonchi or wheezes  CV: regular rate and rhythm, normal S1 S2, no S3 or S4, no murmur, click or rub, no peripheral edema and peripheral pulses strong  ABDOMEN: soft, nontender, no hepatosplenomegaly, no masses and bowel sounds normal  MS: no gross musculoskeletal defects noted, no edema    Diagnostic Test Results:  No results found for any visits on 12/22/23.    ASSESSMENT/PLAN:   Maribell was seen today for physical, gastrointestinal problem, sinus problem, insomnia and joint pain.    Diagnoses and all orders for this visit:    Routine general medical examination at a health care facility  Patient is here, up to date on screenings, vaccinations.     Essential Hypercholesterolemia  Chronic, stable with lifestyle modifications. Due for recheck in October.   -     Lipid panel reflex to direct LDL Fasting; Future    Gastroesophageal reflux disease with esophagitis without hemorrhage  Chronic symptoms. Recommend we check for anemia to discuss if patient needing EGD. Given lack of insurance coverage, she will pickup famotidine on her own. Recommend 40 mg, and decrease as needed.   -     CBC with platelets and differential; Future    Allergic sinusitis  Chronic symptoms. Unclear if nasal polyp present, recommend initial treatment with topical steroid and ENT to follow up.  -     Adult ENT  Referral; Future  -     fluticasone (FLONASE) 50 MCG/ACT nasal spray; Spray 1 spray into " both nostrils daily    Essential hypertension, benign  Chronic, patient with white coat hypertension. Will have patient re-check at home. Recommend continuing lisinopril-hcthz.   -     REVIEW OF HEALTH MAINTENANCE PROTOCOL ORDERS    Insomnia, unspecified type  Chronic, ongoing. Unclear etiology. Recommend sleep meeting to rule out sleep disorders given poor quality of rest.   -     Adult Sleep Eval & Management  Referral; Future    Other orders  -     PRIMARY CARE FOLLOW-UP SCHEDULING; Future  -     REVIEW OF HEALTH MAINTENANCE PROTOCOL ORDERS  -     PRIMARY CARE FOLLOW-UP SCHEDULING; Future        Patient has been advised of split billing requirements and indicates understanding: Yes      COUNSELING:  Reviewed preventive health counseling, as reflected in patient instructions        She reports that she has never smoked. She has never used smokeless tobacco.      OTTONIEL HARTLEY DO  [facility]

## 2024-01-03 ENCOUNTER — LAB (OUTPATIENT)
Dept: LAB | Facility: CLINIC | Age: 59
End: 2024-01-03
Payer: COMMERCIAL

## 2024-01-03 DIAGNOSIS — K21.00 GASTROESOPHAGEAL REFLUX DISEASE WITH ESOPHAGITIS WITHOUT HEMORRHAGE: ICD-10-CM

## 2024-01-03 DIAGNOSIS — E78.00 PURE HYPERCHOLESTEROLEMIA: ICD-10-CM

## 2024-01-03 DIAGNOSIS — I10 ESSENTIAL HYPERTENSION: ICD-10-CM

## 2024-01-03 LAB
ANION GAP SERPL CALCULATED.3IONS-SCNC: 10 MMOL/L (ref 7–15)
BASOPHILS # BLD AUTO: 0 10E3/UL (ref 0–0.2)
BASOPHILS NFR BLD AUTO: 1 %
BUN SERPL-MCNC: 9.8 MG/DL (ref 6–20)
CALCIUM SERPL-MCNC: 10.1 MG/DL (ref 8.6–10)
CHLORIDE SERPL-SCNC: 96 MMOL/L (ref 98–107)
CHOLEST SERPL-MCNC: 248 MG/DL
CREAT SERPL-MCNC: 0.76 MG/DL (ref 0.51–0.95)
CREAT UR-MCNC: 77 MG/DL
DEPRECATED HCO3 PLAS-SCNC: 27 MMOL/L (ref 22–29)
EGFRCR SERPLBLD CKD-EPI 2021: 90 ML/MIN/1.73M2
EOSINOPHIL # BLD AUTO: 0.1 10E3/UL (ref 0–0.7)
EOSINOPHIL NFR BLD AUTO: 2 %
ERYTHROCYTE [DISTWIDTH] IN BLOOD BY AUTOMATED COUNT: 12.2 % (ref 10–15)
FASTING STATUS PATIENT QL REPORTED: YES
GLUCOSE SERPL-MCNC: 100 MG/DL (ref 70–99)
HCT VFR BLD AUTO: 41.1 % (ref 35–47)
HDLC SERPL-MCNC: 81 MG/DL
HGB BLD-MCNC: 14.2 G/DL (ref 11.7–15.7)
IMM GRANULOCYTES # BLD: 0 10E3/UL
IMM GRANULOCYTES NFR BLD: 0 %
LDLC SERPL CALC-MCNC: 148 MG/DL
LYMPHOCYTES # BLD AUTO: 1.4 10E3/UL (ref 0.8–5.3)
LYMPHOCYTES NFR BLD AUTO: 29 %
MCH RBC QN AUTO: 30.9 PG (ref 26.5–33)
MCHC RBC AUTO-ENTMCNC: 34.5 G/DL (ref 31.5–36.5)
MCV RBC AUTO: 89 FL (ref 78–100)
MICROALBUMIN UR-MCNC: <12 MG/L
MICROALBUMIN/CREAT UR: NORMAL MG/G{CREAT}
MONOCYTES # BLD AUTO: 0.4 10E3/UL (ref 0–1.3)
MONOCYTES NFR BLD AUTO: 8 %
NEUTROPHILS # BLD AUTO: 2.9 10E3/UL (ref 1.6–8.3)
NEUTROPHILS NFR BLD AUTO: 60 %
NONHDLC SERPL-MCNC: 167 MG/DL
PLATELET # BLD AUTO: 275 10E3/UL (ref 150–450)
POTASSIUM SERPL-SCNC: 4 MMOL/L (ref 3.4–5.3)
RBC # BLD AUTO: 4.6 10E6/UL (ref 3.8–5.2)
SODIUM SERPL-SCNC: 133 MMOL/L (ref 135–145)
TRIGL SERPL-MCNC: 96 MG/DL
WBC # BLD AUTO: 4.8 10E3/UL (ref 4–11)

## 2024-01-03 PROCEDURE — 82570 ASSAY OF URINE CREATININE: CPT

## 2024-01-03 PROCEDURE — 80048 BASIC METABOLIC PNL TOTAL CA: CPT

## 2024-01-03 PROCEDURE — 85025 COMPLETE CBC W/AUTO DIFF WBC: CPT

## 2024-01-03 PROCEDURE — 82043 UR ALBUMIN QUANTITATIVE: CPT

## 2024-01-03 PROCEDURE — 36415 COLL VENOUS BLD VENIPUNCTURE: CPT

## 2024-01-03 PROCEDURE — 80061 LIPID PANEL: CPT

## 2024-01-05 NOTE — RESULT ENCOUNTER NOTE
The 10-year ASCVD risk score (Venkat EDWARDS, et al., 2019) is: 4.1%    Values used to calculate the score:      Age: 58 years      Sex: Female      Is Non- : No      Diabetic: No      Tobacco smoker: No      Systolic Blood Pressure: 144 mmHg      Is BP treated: Yes      HDL Cholesterol: 81 mg/dL      Total Cholesterol: 248 mg/dL    Low risk- recommend next lipid panel in 1-2 years

## 2024-01-24 ENCOUNTER — OFFICE VISIT (OUTPATIENT)
Dept: SLEEP MEDICINE | Facility: CLINIC | Age: 59
End: 2024-01-24
Attending: FAMILY MEDICINE
Payer: COMMERCIAL

## 2024-01-24 VITALS
HEART RATE: 67 BPM | HEIGHT: 65 IN | SYSTOLIC BLOOD PRESSURE: 182 MMHG | WEIGHT: 133.6 LBS | OXYGEN SATURATION: 100 % | DIASTOLIC BLOOD PRESSURE: 72 MMHG | BODY MASS INDEX: 22.26 KG/M2

## 2024-01-24 DIAGNOSIS — I10 BENIGN ESSENTIAL HYPERTENSION: ICD-10-CM

## 2024-01-24 DIAGNOSIS — F41.9 ANXIETY: ICD-10-CM

## 2024-01-24 DIAGNOSIS — R06.83 SNORING: ICD-10-CM

## 2024-01-24 DIAGNOSIS — R53.82 CHRONIC FATIGUE: ICD-10-CM

## 2024-01-24 DIAGNOSIS — G47.00 PERSISTENT INSOMNIA: Primary | ICD-10-CM

## 2024-01-24 PROCEDURE — 99204 OFFICE O/P NEW MOD 45 MIN: CPT | Performed by: INTERNAL MEDICINE

## 2024-01-24 RX ORDER — FAMOTIDINE 20 MG/1
20 TABLET, FILM COATED ORAL DAILY
COMMUNITY
End: 2024-03-05

## 2024-01-24 ASSESSMENT — SLEEP AND FATIGUE QUESTIONNAIRES
HOW LIKELY ARE YOU TO NOD OFF OR FALL ASLEEP WHILE SITTING AND TALKING TO SOMEONE: WOULD NEVER DOZE
HOW LIKELY ARE YOU TO NOD OFF OR FALL ASLEEP IN A CAR, WHILE STOPPED FOR A FEW MINUTES IN TRAFFIC: WOULD NEVER DOZE
HOW LIKELY ARE YOU TO NOD OFF OR FALL ASLEEP WHILE LYING DOWN TO REST IN THE AFTERNOON WHEN CIRCUMSTANCES PERMIT: WOULD NEVER DOZE
HOW LIKELY ARE YOU TO NOD OFF OR FALL ASLEEP WHILE WATCHING TV: WOULD NEVER DOZE
HOW LIKELY ARE YOU TO NOD OFF OR FALL ASLEEP WHILE SITTING QUIETLY AFTER LUNCH WITHOUT ALCOHOL: WOULD NEVER DOZE
HOW LIKELY ARE YOU TO NOD OFF OR FALL ASLEEP WHEN YOU ARE A PASSENGER IN A CAR FOR AN HOUR WITHOUT A BREAK: WOULD NEVER DOZE
HOW LIKELY ARE YOU TO NOD OFF OR FALL ASLEEP WHILE SITTING AND READING: WOULD NEVER DOZE
HOW LIKELY ARE YOU TO NOD OFF OR FALL ASLEEP WHILE SITTING INACTIVE IN A PUBLIC PLACE: WOULD NEVER DOZE

## 2024-01-24 NOTE — NURSING NOTE
"Chief Complaint   Patient presents with    Sleep Problem     Insomnia and snoring, referred by PCP       Initial BP (!) 182/72   Pulse 67   Ht 1.638 m (5' 4.5\")   Wt 60.6 kg (133 lb 9.6 oz)   LMP  (LMP Unknown)   SpO2 100%   BMI 22.58 kg/m   Estimated body mass index is 22.58 kg/m  as calculated from the following:    Height as of this encounter: 1.638 m (5' 4.5\").    Weight as of this encounter: 60.6 kg (133 lb 9.6 oz).    Medication Reconciliation: complete    Neck circumference: 13 inches / 33 centimeters.    DME: n/a    Sleep study and follow-up appt scheduled.      Lisa Wray MA  "

## 2024-01-24 NOTE — PATIENT INSTRUCTIONS
Patient education: What is a sleep study?     What is a sleep study? -- A sleep study is a test that measures how well you sleep and checks for sleep problems. For some sleep studies, you stay overnight in a sleep lab at a hospital or sleep center.     What happens during a sleep study? -- Before you go to sleep, a technician attaches small, sticky patches called  electrodes  to your head, chest, and legs. He or she will also place a small tube beneath your nose and might wrap 1 or 2 belts around your chest.   Each of these items has wires that connect to monitors. The monitors record your movement, brain activity, breathing, and other body functions while you sleep.  If you have a history of trouble falling asleep, your doctor might prescribe a medicine to help you fall asleep in the lab. If you have never taken the medicine before, your doctor might ask you take it on a night before your sleep study to see how it affects you.   Why might my doctor order a sleep study? -- Your doctor will order a sleep study if he or she thinks you have sleep apnea or a different condition that makes you:   ?Have sudden jerking leg movements while you sleep, called  periodic limb movements.    ?Feel very sleepy during the day and fall asleep all of a sudden, called  narcolepsy.    ?Have trouble falling asleep or staying asleep over a long period of time, called  chronic insomnia.    ?Do odd things while you sleep, such as walking.  How should I prepare for a sleep study? -- On the day of your sleep study, you should:   ?Avoid alcohol   ?Avoid drinking coffee, tea, sodas, and other drinks that have caffeine in the afternoon and evening   ?Take all of your regular medicines     The cost of care estimate line is [phone]. They are able to give the patient an estimate of the charges and also an estimate of their insurance coverage/patient responsibility.   After your sleep study is performed, please call us at [phone] or  [phone]  to schedule for a follow up to review the results of the sleep study.    Please bring one tab of low dose melatonin 3 mg or less to the night of the study.    Melatonin intake is completely voluntary.    You may take own melatonin after arrival to sleep center. Do not drive or operate machinery after intake of melatonin.     Please make every effort you can to sleep on your back with one pillow behind your head.    Please also call your insurance company next week to see if your sleep study is approved and find out your co-pay.     meconium stained fluid/abnormal fetal heart rate tracing
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
## 2024-02-14 ENCOUNTER — TELEPHONE (OUTPATIENT)
Dept: FAMILY MEDICINE | Facility: CLINIC | Age: 59
End: 2024-02-14
Payer: COMMERCIAL

## 2024-02-14 DIAGNOSIS — K21.00 GASTROESOPHAGEAL REFLUX DISEASE WITH ESOPHAGITIS WITHOUT HEMORRHAGE: Primary | ICD-10-CM

## 2024-02-14 NOTE — TELEPHONE ENCOUNTER
Order/Referral Request    Who is requesting: Patient    Orders being requested: Gastro - per patient PCP gave RX for famotidine (PEPCID) 20 MG tablet.  It has helped some but feels she needs to be seen by Gastrology.     Patient has referral for Sleep but the appointment is not until July.  Is there somewhere else she can go sooner?  She feels that since she is having issues she needs to be seen sooner rather than later.       When are orders needed by: at earliest convenience    Has this been discussed with Provider: unknown to Writer    Does patient have a preference on a Group/Provider/Facility? Where ever she can schedule appointment sooner rather than later    Does patient have an appointment scheduled?: No    Where to send orders: Place orders within Epic and possible fax    Could we send this information to you in [employer] or would you prefer to receive a phone call?:   Patient would like to be contacted via [employer]

## 2024-02-21 NOTE — TELEPHONE ENCOUNTER
Unaware of sleep centers with sooner availability. In addition to being added to a wait list, they can work with insurance to find an in network clinic and coordinate from there.

## 2024-02-22 NOTE — TELEPHONE ENCOUNTER
Patient calling back    Writer relayed the message    Will be getting a second opinion through [facility]     She set up an appt on 3/5/2024

## 2024-03-05 ENCOUNTER — OFFICE VISIT (OUTPATIENT)
Dept: FAMILY MEDICINE | Facility: CLINIC | Age: 59
End: 2024-03-05
Payer: COMMERCIAL

## 2024-03-05 VITALS
TEMPERATURE: 98.1 F | HEIGHT: 65 IN | BODY MASS INDEX: 21.94 KG/M2 | HEART RATE: 59 BPM | WEIGHT: 131.7 LBS | OXYGEN SATURATION: 97 % | RESPIRATION RATE: 18 BRPM | DIASTOLIC BLOOD PRESSURE: 60 MMHG | SYSTOLIC BLOOD PRESSURE: 118 MMHG

## 2024-03-05 DIAGNOSIS — K21.00 GASTROESOPHAGEAL REFLUX DISEASE WITH ESOPHAGITIS WITHOUT HEMORRHAGE: Primary | ICD-10-CM

## 2024-03-05 DIAGNOSIS — G47.09 OTHER INSOMNIA: ICD-10-CM

## 2024-03-05 DIAGNOSIS — I10 ESSENTIAL HYPERTENSION, BENIGN: ICD-10-CM

## 2024-03-05 PROBLEM — G47.00 INSOMNIA, UNSPECIFIED TYPE: Status: ACTIVE | Noted: 2024-03-05

## 2024-03-05 PROCEDURE — 99214 OFFICE O/P EST MOD 30 MIN: CPT | Performed by: FAMILY MEDICINE

## 2024-03-05 ASSESSMENT — PATIENT HEALTH QUESTIONNAIRE - PHQ9
SUM OF ALL RESPONSES TO PHQ QUESTIONS 1-9: 13
SUM OF ALL RESPONSES TO PHQ QUESTIONS 1-9: 13
10. IF YOU CHECKED OFF ANY PROBLEMS, HOW DIFFICULT HAVE THESE PROBLEMS MADE IT FOR YOU TO DO YOUR WORK, TAKE CARE OF THINGS AT HOME, OR GET ALONG WITH OTHER PEOPLE: NOT DIFFICULT AT ALL

## 2024-03-05 ASSESSMENT — PAIN SCALES - GENERAL: PAINLEVEL: NO PAIN (0)

## 2024-03-05 NOTE — PROGRESS NOTES
Assessment & Plan     Gastroesophageal reflux disease with esophagitis without hemorrhage  Chronic, improved. Patient still having episodic flares. Discussed taking as needed famotidine OR taking every other day dosing until bloating resolves, as that was a persistent symptom.     Essential hypertension, benign  Chronic, stable. Patient denies side effects. Recommend continuing lisinopril-hydrochlorothiazide 20-12.5 mg.     Other insomnia  Chronic, not well controlled. Undergoing sleep study in April due to possible DAWSON. Discussed menopausal symptoms of sleep disturbance. Patient did report her symptoms started then, however she has not tolerated lexapro or gabapentin for menopausal symptoms. Unable to do HRT due to family history of breast cancer. Could consider veozah, however primarily for hot flash control. Patient's plan does not have coverage.       Ordering of each unique test  Prescription drug management  I spent a total of 29 minutes on the day of the visit.   Time spent by me doing chart review, history and exam, documentation and further activities per the note      Depression Screening Follow Up        3/5/2024     9:36 AM   PHQ   PHQ-9 Total Score 13   Q9: Thoughts of better off dead/self-harm past 2 weeks Not at all         3/5/2024     9:36 AM   Last PHQ-9   1.  Little interest or pleasure in doing things 1   2.  Feeling down, depressed, or hopeless 2   3.  Trouble falling or staying asleep, or sleeping too much 3   4.  Feeling tired or having little energy 3   5.  Poor appetite or overeating 1   6.  Feeling bad about yourself 1   7.  Trouble concentrating 2   8.  Moving slowly or restless 0   Q9: Thoughts of better off dead/self-harm past 2 weeks 0   PHQ-9 Total Score 13       Follow Up Actions Taken  Patient counseled, no additional follow up at this time.       See Patient Instructions    Mitra Reyna is a 58 year old, presenting for the following health issues:  Follow Up        3/5/2024  "    9:49 AM   Additional Questions   Roomed by Jose RODRIGUEZ   Accompanied by N/A     Via the Health Maintenance questionnaire, the patient has reported the following services have been completed -Cervical Cancer Screening, this information has been sent to the abstraction team.    History of Present Illness       Headaches:   Since the patient's last clinic visit, headaches are: no change  The patient is getting headaches:  Once  a day  She is able to do normal daily activities when she has a migraine.  The patient is taking the following rescue/relief medications:  No rescue/relief medications   Patient states \"I get no relief\" from the rescue/relief medications.   The patient is taking the following medications to prevent migraines:  No medications to prevent migraines  In the past 4 weeks, the patient has gone to an Urgent Care or Emergency Room 0 times times due to headaches.    Reason for visit:  Follow up with gastro int issues and headaches    She eats 4 or more servings of fruits and vegetables daily.She consumes 0 sweetened beverage(s) daily.She exercises with enough effort to increase her heart rate 20 to 29 minutes per day.  She exercises with enough effort to increase her heart rate 6 days per week.   She is taking medications regularly.     GERD  -Patient still has some degree of bloating.  -She stopped taking the pepcid AC several weeks ago.   -She is doing lifestyle changes, taking the tums as needed.    Insomnia  -Patient is still not sleeping well. She is able to fall asleep faster, but getting still 5-6 hours of sleep and feeling fatigued.   -Head throbbing feeling when she would stand up, bilateral. Would also get a headache when standing up. Has been ongoing for 2-3 weeks. Last week it was gone.   -Patient's unable get sleep study done on a timely manner.   -She has done melatonin 3 mg at night with some relief. (Mild) She does report hot flashes keep her awake. She has failed lexapro, " "    BP  120s/70s mmHg     Review of Systems  Constitutional, HEENT, cardiovascular, pulmonary, gi and gu systems are negative, except as otherwise noted.      Objective    /60   Pulse 59   Temp 98.1  F (36.7  C) (Oral)   Resp 18   Ht 1.638 m (5' 4.5\")   Wt 59.7 kg (131 lb 11.2 oz)   LMP  (LMP Unknown)   SpO2 97%   BMI 22.26 kg/m    Body mass index is 22.26 kg/m .  Physical Exam   GENERAL: alert and no distress  EYES: Eyes grossly normal to inspection, PERRL and conjunctivae and sclerae normal  NECK: no adenopathy, no asymmetry, masses, or scars  MS: no gross musculoskeletal defects noted, no edema  SKIN: no suspicious lesions or rashes    Lab on 01/03/2024   Component Date Value Ref Range Status    Sodium 01/03/2024 133 (L)  135 - 145 mmol/L Final    Reference intervals for this test were updated on 09/26/2023 to more accurately reflect our healthy population. There may be differences in the flagging of prior results with similar values performed with this method. Interpretation of those prior results can be made in the context of the updated reference intervals.     Potassium 01/03/2024 4.0  3.4 - 5.3 mmol/L Final    Chloride 01/03/2024 96 (L)  98 - 107 mmol/L Final    Carbon Dioxide (CO2) 01/03/2024 27  22 - 29 mmol/L Final    Anion Gap 01/03/2024 10  7 - 15 mmol/L Final    Urea Nitrogen 01/03/2024 9.8  6.0 - 20.0 mg/dL Final    Creatinine 01/03/2024 0.76  0.51 - 0.95 mg/dL Final    GFR Estimate 01/03/2024 90  >60 mL/min/1.73m2 Final    Calcium 01/03/2024 10.1 (H)  8.6 - 10.0 mg/dL Final    Glucose 01/03/2024 100 (H)  70 - 99 mg/dL Final    Creatinine Urine mg/dL 01/03/2024 77.0  mg/dL Final    The reference ranges have not been established in urine creatinine. The results should be integrated into the clinical context for interpretation.    Albumin Urine mg/L 01/03/2024 <12.0  mg/L Final    The reference ranges have not been established in urine albumin. The results should be integrated into the " clinical context for interpretation.    Albumin Urine mg/g Cr 01/03/2024    Final    Unable to calculate, urine albumin and/or urine creatinine is outside detectable limits.  Microalbuminuria is defined as an albumin:creatinine ratio of 17 to 299 for males and 25 to 299 for females. A ratio of albumin:creatinine of 300 or higher is indicative of overt proteinuria.  Due to biologic variability, positive results should be confirmed by a second, first-morning random or 24-hour timed urine specimen. If there is discrepancy, a third specimen is recommended. When 2 out of 3 results are in the microalbuminuria range, this is evidence for incipient nephropathy and warrants increased efforts at glucose control, blood pressure control, and institution of therapy with an angiotensin-converting-enzyme (ACE) inhibitor (if the patient can tolerate it).      Cholesterol 01/03/2024 248 (H)  <200 mg/dL Final    Triglycerides 01/03/2024 96  <150 mg/dL Final    Direct Measure HDL 01/03/2024 81  >=50 mg/dL Final    LDL Cholesterol Calculated 01/03/2024 148 (H)  <=100 mg/dL Final    Non HDL Cholesterol 01/03/2024 167 (H)  <130 mg/dL Final    Patient Fasting > 8hrs? 01/03/2024 Yes   Final    WBC Count 01/03/2024 4.8  4.0 - 11.0 10e3/uL Final    RBC Count 01/03/2024 4.60  3.80 - 5.20 10e6/uL Final    Hemoglobin 01/03/2024 14.2  11.7 - 15.7 g/dL Final    Hematocrit 01/03/2024 41.1  35.0 - 47.0 % Final    MCV 01/03/2024 89  78 - 100 fL Final    MCH 01/03/2024 30.9  26.5 - 33.0 pg Final    MCHC 01/03/2024 34.5  31.5 - 36.5 g/dL Final    RDW 01/03/2024 12.2  10.0 - 15.0 % Final    Platelet Count 01/03/2024 275  150 - 450 10e3/uL Final    % Neutrophils 01/03/2024 60  % Final    % Lymphocytes 01/03/2024 29  % Final    % Monocytes 01/03/2024 8  % Final    % Eosinophils 01/03/2024 2  % Final    % Basophils 01/03/2024 1  % Final    % Immature Granulocytes 01/03/2024 0  % Final    Absolute Neutrophils 01/03/2024 2.9  1.6 - 8.3 10e3/uL Final     Absolute Lymphocytes 01/03/2024 1.4  0.8 - 5.3 10e3/uL Final    Absolute Monocytes 01/03/2024 0.4  0.0 - 1.3 10e3/uL Final    Absolute Eosinophils 01/03/2024 0.1  0.0 - 0.7 10e3/uL Final    Absolute Basophils 01/03/2024 0.0  0.0 - 0.2 10e3/uL Final    Absolute Immature Granulocytes 01/03/2024 0.0  <=0.4 10e3/uL Final           Signed Electronically by: OTTONIEL HARTLEY DO

## 2024-04-05 ENCOUNTER — MYC MEDICAL ADVICE (OUTPATIENT)
Dept: FAMILY MEDICINE | Facility: CLINIC | Age: 59
End: 2024-04-05
Payer: COMMERCIAL

## 2024-04-05 DIAGNOSIS — R10.84 ABDOMINAL PAIN, GENERALIZED: Primary | ICD-10-CM

## 2024-04-08 ENCOUNTER — TELEPHONE (OUTPATIENT)
Dept: GASTROENTEROLOGY | Facility: CLINIC | Age: 59
End: 2024-04-08
Payer: COMMERCIAL

## 2024-04-08 NOTE — TELEPHONE ENCOUNTER
[facility] Call Center    Phone Message    May a detailed message be left on voicemail: Yes    Reason for Call: Other: Patient is currently scheduled on 5/24, as visit type New GI Urgent. This is outside the expected timeline for this referral. Patient declined earlier appts due to location and wanted in-person.    Action Taken: Message routed to:  Other: GI REFERRAL TRIAGE POOL     Travel Screening: Not Applicable

## 2024-04-11 NOTE — TELEPHONE ENCOUNTER
Writer called and left voice message for Pt. Called Pt to help get them scheduled for a sooner GI appointment. Writer left call back number.    Writer will send Pt a Qooplhart message.

## 2024-04-17 NOTE — TELEPHONE ENCOUNTER
Writer called and left voice message for Pt. Called Pt to help get them scheduled for a sooner GI appointment. Writer left call back number.

## 2024-05-08 NOTE — TELEPHONE ENCOUNTER
REFERRAL INFORMATION:  Referring Provider:  Dhara Márquez DO  Referring Clinic:  [facility]  Reason for Visit/Diagnosis: Abdominal pain, generalized      FUTURE VISIT INFORMATION:  Appointment Date: 5/24/24  Appointment Time: 9:00     NOTES STATUS DETAILS   OFFICE NOTE from Referring Provider Internal [facility] med advice-4/5/24, 3/5/24, 12/22/23-Dhara Arredondo DO   MEDICATION LIST Internal         COLONOSCOPY Internal 6/7/16-Martha Campos MD   PERTINENT LABS Internal

## 2024-05-23 NOTE — PROGRESS NOTES
GI CLINIC VISIT    CC/REFERRING MD:  Dhara Márquez  REASON FOR CONSULTATION:   Maribell Cabrera is a 58 year old female who I was asked to see in consultation at the request of Dr. Dhara Márquez for   Chief Complaint   Patient presents with    New Patient       ASSESSMENT/PLAN:  1. Regurgitation of food  2. Bloating  3. Belching  58 year old female with new onset regurgitation, abdominal fullness, postprandial lower abdominal bloating, belching and intermittent heartburn that started last fall. She finds symptoms worsen with bending forward while gardening, and when lying down after eating or drinking, even if she only has water. She believes she may have had a hiatal hernia as a baby that was not treated surgically. She has a family history of GIST in her father, who passed away from this. He was diagnosed at age 79. Her sisters have GERD, otherwise no other pertinent family history. She has tried 2 months on famotidine which was helpful but discontinued to avoid any potential side effects. She has tried avoiding spicy foods, cutting back on coffee and eating earlier in the day, smaller meals.     She does have a bit of catching in her upper esophagus with eating on rarely. We discussed the option of doing a video swallow study and esophagram but she would like to hold off on this.     She does tend to be constipated, skipping up to 3 days without a bowel movement. Typically has BSC type 3-4 stools. Takes 1-2 psyllium capsules daily.    Differential diagnosis include GERD, dyspepsia, peptic ulcer disease, constipation, hiatal hernia, H pylori.    We reviewed the following plan:   -- Could try Benefiber which is without added sweeteners. Juan and flax are also good.   -- Start on some miralax: anywhere from 1/2 capful to 3 capfuls daily to help soften stools and pull more water into the colon   -- Omeprazole daily 30-60 minutes before first meal of the day for 2 months, then discontinue and start  back on famotidine as needed at bedtime  -- FD Brenna is a supplement over the counter for indigestion type symptoms. She will hold off on this until after omeprazole trial  -- Avoid late night meals, large meals, super spicy, peppermint, chocolate, caffeine. Using a wedge pillow pillow at night can help or prop up your bedposts with risers   -- Using a toilet stool while sitting on the toilet is helpful   -- Follow up in 3 months     - omeprazole (PRILOSEC) 40 MG DR capsule; Take 1 capsule (40 mg) by mouth daily Take 30-60 minutes before first meal of the day for 2 months  Dispense: 60 capsule; Refill: 0  - Adult GI Clinic Follow-Up Order (Blank); Future     RTC 3 months      Thank you for this consultation. It was a pleasure to participate in the care of this patient; please contact us with any further questions.  A total of 47 face-to-face minutes was spent with this patient, >50% of which was counseling regarding the above delineated issues. An additional 10 minutes was spent on the date of the encounter doing chart review, documentation, and further activities as noted above.    Jill Nicole PA-C  Division of Gastroenterology, Hepatology and Nutrition  [facility]    ---------------------------------------------------------------------------------------------------  HPI:  Maribell Cabrera is a 58 year old female with past medical history significant for hypertension, hypercholesterolemia, and insomnia who presents for indigestion, bloating, GERD. She was recommended to start famotidine 40 mg daily back in December.     Yesterday she reports she took a nap after a 1/2 glass of water, no food for over 2 hours and immediately felt reflux and nauseous. Has minor heartburn. Reflux started last fall for the first time. No change in diet. Was taking tums, stopped eating right before bed. Has had postprandial bloating lower belly, belching, gas, flatulence. Bending forward while gardening causes regurgitation.  Rare acid brash.  No nocturnal awakening but dealing with insomnia. Doing a sleep test tonight at home. No vomiting. Nausea is fairly mild. Still able to eat normally. No early satiety. No dysphagia. Feels her esophagus spasms in the mid neck as food or liquid is going down, since the fall. Pulmonologist noted a narrow airway. She tends to be on the constipated side, started a fiber supplement every morning. She will skip up to 3 days without a bowel movement. Skips a day or two per week. Stools are bristol type 3-4. Not a lot of straining, unless its been a couple of days. No blood or black. Weight has been stable.  No chewing gum, and doesn't use straws. Usually finishes her meal last.     Aggravating - bending forward, spicy foods  Alleviating/treatments tried - tums as needed, famotidine qAM for a couple of month was helpful     Family history- Dad passed away from a GIST tumor diagnosed at age 79, sisters with GERD, no IBD, celiac, or GI malignancies. Some third degree relatives (maternal great uncle's son) with esophageal cancer   NSAID use-- rarely. Did take a lot of Advil in her 20s.  Alcohol use-- rarely   Hx abd surgeries-- none   Hx colonoscopy -- 2016 with hyperplastic polyp recommended to repeat in 10 years    ROS:    A 10 point review of systems was performed and is negative except as noted in the HPI.     PERTINENT PAST MEDICAL HISTORY:  No past medical history on file.    PREVIOUS SURGERIES:  Past Surgical History:   Procedure Laterality Date    MOUTH SURGERY         PREVIOUS ENDOSCOPY:  6/2016         ALLERGIES:   No Known Allergies    PERTINENT MEDICATIONS:  Current Outpatient Medications   Medication Sig Dispense Refill    fluticasone (FLONASE) 50 MCG/ACT nasal spray Spray 1 spray into both nostrils daily 16 g 0    lisinopril-hydrochlorothiazide (ZESTORETIC) 20-12.5 MG tablet Take 1 tablet by mouth daily 90 tablet 3    Vitamin D3 (CHOLECALCIFEROL) 25 mcg (1000 units) tablet Take 1 tablet by mouth  "daily       No current facility-administered medications for this visit.       FAMILY HISTORY:  Family History   Problem Relation Age of Onset    Breast Cancer Mother 50    Melanoma Father         @79    Cancer Father         gastro stromal tumor    Diabetes Father     Hypertension Father     Snoring Father     Breast Cancer Sister 54        DCIS    Arthritis Sister     Diverticulosis Sister     GERD Sister     Coronary Artery Disease Maternal Grandfather     Cerebrovascular Disease Paternal Grandmother     Coronary Artery Disease Paternal Grandfather     Heart Disease Maternal Uncle        Past/family/social history reviewed and no changes    PHYSICAL EXAMINATION:  Vitals BP (!) 153/84   Pulse 78   Ht 1.626 m (5' 4\")   Wt 59.9 kg (132 lb)   LMP  (LMP Unknown)   SpO2 100%   BMI 22.66 kg/m     Wt   Wt Readings from Last 2 Encounters:   05/24/24 59.9 kg (132 lb)   03/05/24 59.7 kg (131 lb 11.2 oz)      Gen: Pt sitting up on exam table in NAD, interactive and cooperative on exam  Eyes: sclerae anicteric, no injection  ENT:  OP clear, MMM, no thyromegaly or thyroid nodules palpated   Cardiac: RRR, nl S1, S2, no murmurs, rubs or gallops  Resp: Clear on posterior exam  GI: Normoactive BS, abd soft, flat, nontender throughout all quadrants, no organomegaly or masses palpated  Skin: Warm, dry, no jaundice, nails appear healthy  Lymph: no submandibular, no cervical, and no supraclavicular LAD  Neuro: alert, oriented, answers questions appropriately    PERTINENT STUDIES:    Lab on 01/03/2024   Component Date Value Ref Range Status    Sodium 01/03/2024 133 (L)  135 - 145 mmol/L Final    Potassium 01/03/2024 4.0  3.4 - 5.3 mmol/L Final    Chloride 01/03/2024 96 (L)  98 - 107 mmol/L Final    Carbon Dioxide (CO2) 01/03/2024 27  22 - 29 mmol/L Final    Anion Gap 01/03/2024 10  7 - 15 mmol/L Final    Urea Nitrogen 01/03/2024 9.8  6.0 - 20.0 mg/dL Final    Creatinine 01/03/2024 0.76  0.51 - 0.95 mg/dL Final    GFR Estimate " 01/03/2024 90  >60 mL/min/1.73m2 Final    Calcium 01/03/2024 10.1 (H)  8.6 - 10.0 mg/dL Final    Glucose 01/03/2024 100 (H)  70 - 99 mg/dL Final    Creatinine Urine mg/dL 01/03/2024 77.0  mg/dL Final    Albumin Urine mg/L 01/03/2024 <12.0  mg/L Final    Albumin Urine mg/g Cr 01/03/2024    Final    Cholesterol 01/03/2024 248 (H)  <200 mg/dL Final    Triglycerides 01/03/2024 96  <150 mg/dL Final    Direct Measure HDL 01/03/2024 81  >=50 mg/dL Final    LDL Cholesterol Calculated 01/03/2024 148 (H)  <=100 mg/dL Final    Non HDL Cholesterol 01/03/2024 167 (H)  <130 mg/dL Final    Patient Fasting > 8hrs? 01/03/2024 Yes   Final    WBC Count 01/03/2024 4.8  4.0 - 11.0 10e3/uL Final    RBC Count 01/03/2024 4.60  3.80 - 5.20 10e6/uL Final    Hemoglobin 01/03/2024 14.2  11.7 - 15.7 g/dL Final    Hematocrit 01/03/2024 41.1  35.0 - 47.0 % Final    MCV 01/03/2024 89  78 - 100 fL Final    MCH 01/03/2024 30.9  26.5 - 33.0 pg Final    MCHC 01/03/2024 34.5  31.5 - 36.5 g/dL Final    RDW 01/03/2024 12.2  10.0 - 15.0 % Final    Platelet Count 01/03/2024 275  150 - 450 10e3/uL Final    % Neutrophils 01/03/2024 60  % Final    % Lymphocytes 01/03/2024 29  % Final    % Monocytes 01/03/2024 8  % Final    % Eosinophils 01/03/2024 2  % Final    % Basophils 01/03/2024 1  % Final    % Immature Granulocytes 01/03/2024 0  % Final    Absolute Neutrophils 01/03/2024 2.9  1.6 - 8.3 10e3/uL Final    Absolute Lymphocytes 01/03/2024 1.4  0.8 - 5.3 10e3/uL Final    Absolute Monocytes 01/03/2024 0.4  0.0 - 1.3 10e3/uL Final    Absolute Eosinophils 01/03/2024 0.1  0.0 - 0.7 10e3/uL Final    Absolute Basophils 01/03/2024 0.0  0.0 - 0.2 10e3/uL Final    Absolute Immature Granulocytes 01/03/2024 0.0  <=0.4 10e3/uL Final

## 2024-05-24 ENCOUNTER — PRE VISIT (OUTPATIENT)
Dept: GASTROENTEROLOGY | Facility: CLINIC | Age: 59
End: 2024-05-24

## 2024-05-24 ENCOUNTER — OFFICE VISIT (OUTPATIENT)
Dept: GASTROENTEROLOGY | Facility: CLINIC | Age: 59
End: 2024-05-24
Attending: FAMILY MEDICINE
Payer: COMMERCIAL

## 2024-05-24 VITALS
OXYGEN SATURATION: 100 % | DIASTOLIC BLOOD PRESSURE: 84 MMHG | HEIGHT: 64 IN | WEIGHT: 132 LBS | HEART RATE: 78 BPM | BODY MASS INDEX: 22.53 KG/M2 | SYSTOLIC BLOOD PRESSURE: 153 MMHG

## 2024-05-24 DIAGNOSIS — R14.0 BLOATING: ICD-10-CM

## 2024-05-24 DIAGNOSIS — R11.10 REGURGITATION OF FOOD: Primary | ICD-10-CM

## 2024-05-24 DIAGNOSIS — R14.2 BELCHING: ICD-10-CM

## 2024-05-24 PROCEDURE — 99205 OFFICE O/P NEW HI 60 MIN: CPT | Performed by: STUDENT IN AN ORGANIZED HEALTH CARE EDUCATION/TRAINING PROGRAM

## 2024-05-24 RX ORDER — OMEPRAZOLE 40 MG/1
40 CAPSULE, DELAYED RELEASE ORAL DAILY
Qty: 60 CAPSULE | Refills: 0 | Status: SHIPPED | OUTPATIENT
Start: 2024-05-24 | End: 2024-10-04

## 2024-05-24 RX ORDER — LORATADINE 10 MG/1
10 TABLET ORAL DAILY
COMMUNITY

## 2024-05-24 ASSESSMENT — PAIN SCALES - GENERAL: PAINLEVEL: NO PAIN (0)

## 2024-05-24 NOTE — LETTER
5/24/2024         RE: Maribell Cabrera  [address] [place] [address]        Dear Colleague,    Thank you for referring your patient, Maribell Cabrera, to the [facility] [place]. Please see a copy of my visit note below.    GI CLINIC VISIT    CC/REFERRING MD:  Dhara Márquez  REASON FOR CONSULTATION:   Maribell Cabrera is a 58 year old female who I was asked to see in consultation at the request of Dr. Dhara Márquez for   Chief Complaint   Patient presents with    New Patient       ASSESSMENT/PLAN:  1. Regurgitation of food  2. Bloating  3. Belching  58 year old female with new onset regurgitation, abdominal fullness, postprandial lower abdominal bloating, belching and intermittent heartburn that started last fall. She finds symptoms worsen with bending forward while gardening, and when lying down after eating or drinking, even if she only has water. She believes she may have had a hiatal hernia as a baby that was not treated surgically. She has a family history of GIST in her father, who passed away from this. He was diagnosed at age 79. Her sisters have GERD, otherwise no other pertinent family history. She has tried 2 months on famotidine which was helpful but discontinued to avoid any potential side effects. She has tried avoiding spicy foods, cutting back on coffee and eating earlier in the day, smaller meals.     She does have a bit of catching in her upper esophagus with eating on rarely. We discussed the option of doing a video swallow study and esophagram but she would like to hold off on this.     She does tend to be constipated, skipping up to 3 days without a bowel movement. Typically has BSC type 3-4 stools. Takes 1-2 psyllium capsules daily.    Differential diagnosis include GERD, dyspepsia, peptic ulcer disease, constipation, hiatal hernia, H pylori.    We reviewed the following plan:   -- Could try Benefiber which is without added sweeteners.  Juan and flax are also good.   -- Start on some miralax: anywhere from 1/2 capful to 3 capfuls daily to help soften stools and pull more water into the colon   -- Omeprazole daily 30-60 minutes before first meal of the day for 2 months, then discontinue and start back on famotidine as needed at bedtime  -- FD Brenna is a supplement over the counter for indigestion type symptoms. She will hold off on this until after omeprazole trial  -- Avoid late night meals, large meals, super spicy, peppermint, chocolate, caffeine. Using a wedge pillow pillow at night can help or prop up your bedposts with risers   -- Using a toilet stool while sitting on the toilet is helpful   -- Follow up in 3 months     - omeprazole (PRILOSEC) 40 MG DR capsule; Take 1 capsule (40 mg) by mouth daily Take 30-60 minutes before first meal of the day for 2 months  Dispense: 60 capsule; Refill: 0  - Adult GI Clinic Follow-Up Order (Blank); Future     RTC 3 months      Thank you for this consultation. It was a pleasure to participate in the care of this patient; please contact us with any further questions.  A total of 47 face-to-face minutes was spent with this patient, >50% of which was counseling regarding the above delineated issues. An additional 10 minutes was spent on the date of the encounter doing chart review, documentation, and further activities as noted above.    Jill Nicole PA-C  Division of Gastroenterology, Hepatology and Nutrition  [facility]    ---------------------------------------------------------------------------------------------------  HPI:  Maribell Cabrera is a 58 year old female with past medical history significant for hypertension, hypercholesterolemia, and insomnia who presents for indigestion, bloating, GERD. She was recommended to start famotidine 40 mg daily back in December.     Yesterday she reports she took a nap after a 1/2 glass of water, no food for over 2 hours and immediately felt reflux and  nauseous. Has minor heartburn. Reflux started last fall for the first time. No change in diet. Was taking tums, stopped eating right before bed. Has had postprandial bloating lower belly, belching, gas, flatulence. Bending forward while gardening causes regurgitation. Rare acid brash.  No nocturnal awakening but dealing with insomnia. Doing a sleep test tonight at home. No vomiting. Nausea is fairly mild. Still able to eat normally. No early satiety. No dysphagia. Feels her esophagus spasms in the mid neck as food or liquid is going down, since the fall. Pulmonologist noted a narrow airway. She tends to be on the constipated side, started a fiber supplement every morning. She will skip up to 3 days without a bowel movement. Skips a day or two per week. Stools are bristol type 3-4. Not a lot of straining, unless its been a couple of days. No blood or black. Weight has been stable.  No chewing gum, and doesn't use straws. Usually finishes her meal last.     Aggravating - bending forward, spicy foods  Alleviating/treatments tried - tums as needed, famotidine qAM for a couple of month was helpful     Family history- Dad passed away from a GIST tumor diagnosed at age 79, sisters with GERD, no IBD, celiac, or GI malignancies. Some third degree relatives (maternal great uncle's son) with esophageal cancer   NSAID use-- rarely. Did take a lot of Advil in her 20s.  Alcohol use-- rarely   Hx abd surgeries-- none   Hx colonoscopy -- 2016 with hyperplastic polyp recommended to repeat in 10 years    ROS:    A 10 point review of systems was performed and is negative except as noted in the HPI.     PERTINENT PAST MEDICAL HISTORY:  No past medical history on file.    PREVIOUS SURGERIES:  Past Surgical History:   Procedure Laterality Date    MOUTH SURGERY         PREVIOUS ENDOSCOPY:  6/2016         ALLERGIES:   No Known Allergies    PERTINENT MEDICATIONS:  Current Outpatient Medications   Medication Sig Dispense Refill     "fluticasone (FLONASE) 50 MCG/ACT nasal spray Spray 1 spray into both nostrils daily 16 g 0    lisinopril-hydrochlorothiazide (ZESTORETIC) 20-12.5 MG tablet Take 1 tablet by mouth daily 90 tablet 3    Vitamin D3 (CHOLECALCIFEROL) 25 mcg (1000 units) tablet Take 1 tablet by mouth daily       No current facility-administered medications for this visit.       FAMILY HISTORY:  Family History   Problem Relation Age of Onset    Breast Cancer Mother 50    Melanoma Father         @79    Cancer Father         gastro stromal tumor    Diabetes Father     Hypertension Father     Snoring Father     Breast Cancer Sister 54        DCIS    Arthritis Sister     Diverticulosis Sister     GERD Sister     Coronary Artery Disease Maternal Grandfather     Cerebrovascular Disease Paternal Grandmother     Coronary Artery Disease Paternal Grandfather     Heart Disease Maternal Uncle        Past/family/social history reviewed and no changes    PHYSICAL EXAMINATION:  Vitals BP (!) 153/84   Pulse 78   Ht 1.626 m (5' 4\")   Wt 59.9 kg (132 lb)   LMP  (LMP Unknown)   SpO2 100%   BMI 22.66 kg/m     Wt   Wt Readings from Last 2 Encounters:   05/24/24 59.9 kg (132 lb)   03/05/24 59.7 kg (131 lb 11.2 oz)      Gen: Pt sitting up on exam table in NAD, interactive and cooperative on exam  Eyes: sclerae anicteric, no injection  ENT:  OP clear, MMM, no thyromegaly or thyroid nodules palpated   Cardiac: RRR, nl S1, S2, no murmurs, rubs or gallops  Resp: Clear on posterior exam  GI: Normoactive BS, abd soft, flat, nontender throughout all quadrants, no organomegaly or masses palpated  Skin: Warm, dry, no jaundice, nails appear healthy  Lymph: no submandibular, no cervical, and no supraclavicular LAD  Neuro: alert, oriented, answers questions appropriately    PERTINENT STUDIES:    Lab on 01/03/2024   Component Date Value Ref Range Status    Sodium 01/03/2024 133 (L)  135 - 145 mmol/L Final    Potassium 01/03/2024 4.0  3.4 - 5.3 mmol/L Final    Chloride " 01/03/2024 96 (L)  98 - 107 mmol/L Final    Carbon Dioxide (CO2) 01/03/2024 27  22 - 29 mmol/L Final    Anion Gap 01/03/2024 10  7 - 15 mmol/L Final    Urea Nitrogen 01/03/2024 9.8  6.0 - 20.0 mg/dL Final    Creatinine 01/03/2024 0.76  0.51 - 0.95 mg/dL Final    GFR Estimate 01/03/2024 90  >60 mL/min/1.73m2 Final    Calcium 01/03/2024 10.1 (H)  8.6 - 10.0 mg/dL Final    Glucose 01/03/2024 100 (H)  70 - 99 mg/dL Final    Creatinine Urine mg/dL 01/03/2024 77.0  mg/dL Final    Albumin Urine mg/L 01/03/2024 <12.0  mg/L Final    Albumin Urine mg/g Cr 01/03/2024    Final    Cholesterol 01/03/2024 248 (H)  <200 mg/dL Final    Triglycerides 01/03/2024 96  <150 mg/dL Final    Direct Measure HDL 01/03/2024 81  >=50 mg/dL Final    LDL Cholesterol Calculated 01/03/2024 148 (H)  <=100 mg/dL Final    Non HDL Cholesterol 01/03/2024 167 (H)  <130 mg/dL Final    Patient Fasting > 8hrs? 01/03/2024 Yes   Final    WBC Count 01/03/2024 4.8  4.0 - 11.0 10e3/uL Final    RBC Count 01/03/2024 4.60  3.80 - 5.20 10e6/uL Final    Hemoglobin 01/03/2024 14.2  11.7 - 15.7 g/dL Final    Hematocrit 01/03/2024 41.1  35.0 - 47.0 % Final    MCV 01/03/2024 89  78 - 100 fL Final    MCH 01/03/2024 30.9  26.5 - 33.0 pg Final    MCHC 01/03/2024 34.5  31.5 - 36.5 g/dL Final    RDW 01/03/2024 12.2  10.0 - 15.0 % Final    Platelet Count 01/03/2024 275  150 - 450 10e3/uL Final    % Neutrophils 01/03/2024 60  % Final    % Lymphocytes 01/03/2024 29  % Final    % Monocytes 01/03/2024 8  % Final    % Eosinophils 01/03/2024 2  % Final    % Basophils 01/03/2024 1  % Final    % Immature Granulocytes 01/03/2024 0  % Final    Absolute Neutrophils 01/03/2024 2.9  1.6 - 8.3 10e3/uL Final    Absolute Lymphocytes 01/03/2024 1.4  0.8 - 5.3 10e3/uL Final    Absolute Monocytes 01/03/2024 0.4  0.0 - 1.3 10e3/uL Final    Absolute Eosinophils 01/03/2024 0.1  0.0 - 0.7 10e3/uL Final    Absolute Basophils 01/03/2024 0.0  0.0 - 0.2 10e3/uL Final    Absolute Immature Granulocytes  01/03/2024 0.0  <=0.4 10e3/uL Final         Again, thank you for allowing me to participate in the care of your patient.      Sincerely,    Jill Nicole PA-C

## 2024-05-24 NOTE — NURSING NOTE
"chief complaint    Vitals:    05/24/24 0855   BP: (!) 153/84   Pulse: 78   SpO2: 100%   Weight: 59.9 kg (132 lb)   Height: 1.626 m (5' 4\")       Body mass index is 22.66 kg/m .    Cindi Bird MA    "

## 2024-05-24 NOTE — PATIENT INSTRUCTIONS
It was a pleasure taking care of you today.  I've included a brief summary of our discussion and care plan from today's visit below.  Please review this information with your primary care provider.  _______________________________________________________________________     My recommendations are summarized as follows:  -- Could try Benefiber which is without added sweeteners. Juan and flax are also good.   -- Start on some miralax: anywhere from 1/2 capful to 3 capfuls daily to help soften stools and pull more water into the colon   -- Omeprazole daily 30-60 minutes before first meal of the day for 2 months, then discontinue and start back on famotidine as needed at bedtime  -- FD Brenna is a supplement over the counter for indigestion type symptom   -- Avoid late night meals, large meals, super spicy, peppermint, chocolate, caffeine. Using a wedge pillow pillow at night can help or prop up your bedposts with risers   -- Using a toilet stool while sitting on the toilet is helpful   -- Follow up in 3 months    ______________________________________________________________________     How do I schedule labs, imaging studies, or procedures that were ordered in clinic today?      Labs: To schedule lab appointment you can contact your local [facility] or call [phone] to schedule at any convenient [facility] location.      Procedures: If a colonoscopy, upper endoscopy, breath test, esophageal manometry, or pH impedence was ordered today, our endoscopy team will call you to schedule this. If you have not heard from our endoscopy team within a week, please call [phone] to schedule.      Imaging Studies: If you were scheduled for a CT scan, X-ray, MRI, ultrasound, HIDA scan or other imaging study, please call [phone] to have this scheduled.      Referral: If a referral to another specialty was ordered, expect a phone call or follow instructions above. If you have not heard from anyone  regarding your referral in a week, please call our clinic to check the status.      Who do I call with any questions after my visit?  Please be in touch if there are any further questions that arise following today's visit.  There are multiple ways to contact your gastroenterology care team.       During business hours, you may reach a Gastroenterology nurse at [phone]     To schedule or reschedule an appointment, please call [phone].      You can always send a secure message through [employer].  [employer] messages are answered by your nurse or doctor typically within 24 hours.  Please allow extra time on weekends and holidays.       For urgent/emergent questions after business hours, you may reach the on-call GI Fellow by contacting the [facility]  at [phone].     How will I get the results of any tests ordered?    You will receive all of your results.  If you have signed up for [employer], any tests ordered at your visit will be available to you after your physician reviews them.  Typically this takes 1-2 weeks.  If there are urgent results that require a change in your care plan, your physician or nurse will call you to discuss the next steps.       What is [employer]?  [employer] is a secure way for you to access all of your healthcare records from the [facility].  It is a web based computer program, so you can sign on to it from any location.  It also allows you to send secure messages to your care team.  I recommend signing up for [employer] access if you have not already done so and are comfortable with using a computer.       How to I schedule a follow-up visit?  If you did not schedule a follow-up visit today, please call [phone] to schedule a follow-up office visit.

## 2024-06-07 ENCOUNTER — TELEPHONE (OUTPATIENT)
Dept: GASTROENTEROLOGY | Facility: CLINIC | Age: 59
End: 2024-06-07
Payer: COMMERCIAL

## 2024-06-07 NOTE — TELEPHONE ENCOUNTER
Left Voicemail (1st Attempt) and Sent Mychart (1st Attempt) for the patient to call back and schedule the following:    Appointment type: RET GI   Provider: Jill Nicole PA-C  Return date: 3 mos   Specialty phone number: [phone]  Additional appointment(s) needed: n/a  Additonal Notes: n/a

## 2024-06-18 DIAGNOSIS — I10 ESSENTIAL HYPERTENSION: ICD-10-CM

## 2024-06-18 DIAGNOSIS — I10 ESSENTIAL HYPERTENSION, BENIGN: ICD-10-CM

## 2024-06-20 RX ORDER — LISINOPRIL AND HYDROCHLOROTHIAZIDE 12.5; 2 MG/1; MG/1
1 TABLET ORAL DAILY
Qty: 90 TABLET | Refills: 0 | Status: SHIPPED | OUTPATIENT
Start: 2024-06-20 | End: 2024-09-17

## 2024-06-24 ENCOUNTER — OFFICE VISIT (OUTPATIENT)
Dept: FAMILY MEDICINE | Facility: CLINIC | Age: 59
End: 2024-06-24
Attending: FAMILY MEDICINE
Payer: COMMERCIAL

## 2024-06-24 VITALS
DIASTOLIC BLOOD PRESSURE: 75 MMHG | BODY MASS INDEX: 24 KG/M2 | HEIGHT: 62 IN | TEMPERATURE: 98 F | RESPIRATION RATE: 18 BRPM | OXYGEN SATURATION: 96 % | WEIGHT: 130.4 LBS | HEART RATE: 71 BPM | SYSTOLIC BLOOD PRESSURE: 114 MMHG

## 2024-06-24 DIAGNOSIS — R25.2 NOCTURNAL MUSCLE CRAMP: Primary | ICD-10-CM

## 2024-06-24 DIAGNOSIS — Z12.4 CERVICAL CANCER SCREENING: ICD-10-CM

## 2024-06-24 LAB
ANION GAP SERPL CALCULATED.3IONS-SCNC: 10 MMOL/L (ref 7–15)
BUN SERPL-MCNC: 10.8 MG/DL (ref 6–20)
CALCIUM SERPL-MCNC: 9.8 MG/DL (ref 8.6–10)
CHLORIDE SERPL-SCNC: 96 MMOL/L (ref 98–107)
CK SERPL-CCNC: 167 U/L (ref 26–192)
CREAT SERPL-MCNC: 0.72 MG/DL (ref 0.51–0.95)
DEPRECATED HCO3 PLAS-SCNC: 26 MMOL/L (ref 22–29)
EGFRCR SERPLBLD CKD-EPI 2021: >90 ML/MIN/1.73M2
GLUCOSE SERPL-MCNC: 96 MG/DL (ref 70–99)
POTASSIUM SERPL-SCNC: 4.6 MMOL/L (ref 3.4–5.3)
SODIUM SERPL-SCNC: 132 MMOL/L (ref 135–145)
TSH SERPL DL<=0.005 MIU/L-ACNC: 1.57 UIU/ML (ref 0.3–4.2)

## 2024-06-24 PROCEDURE — 87624 HPV HI-RISK TYP POOLED RSLT: CPT | Performed by: FAMILY MEDICINE

## 2024-06-24 PROCEDURE — 36415 COLL VENOUS BLD VENIPUNCTURE: CPT | Performed by: FAMILY MEDICINE

## 2024-06-24 PROCEDURE — 80048 BASIC METABOLIC PNL TOTAL CA: CPT | Performed by: FAMILY MEDICINE

## 2024-06-24 PROCEDURE — 82550 ASSAY OF CK (CPK): CPT | Performed by: FAMILY MEDICINE

## 2024-06-24 PROCEDURE — 99213 OFFICE O/P EST LOW 20 MIN: CPT | Performed by: FAMILY MEDICINE

## 2024-06-24 PROCEDURE — G0145 SCR C/V CYTO,THINLAYER,RESCR: HCPCS | Performed by: FAMILY MEDICINE

## 2024-06-24 PROCEDURE — 84443 ASSAY THYROID STIM HORMONE: CPT | Performed by: FAMILY MEDICINE

## 2024-06-24 RX ORDER — DIPHENOXYLATE HYDROCHLORIDE AND ATROPINE SULFATE 2.5; .025 MG/1; MG/1
1 TABLET ORAL DAILY
COMMUNITY

## 2024-06-24 SDOH — HEALTH STABILITY: PHYSICAL HEALTH: ON AVERAGE, HOW MANY MINUTES DO YOU ENGAGE IN EXERCISE AT THIS LEVEL?: 30 MIN

## 2024-06-24 SDOH — HEALTH STABILITY: PHYSICAL HEALTH: ON AVERAGE, HOW MANY DAYS PER WEEK DO YOU ENGAGE IN MODERATE TO STRENUOUS EXERCISE (LIKE A BRISK WALK)?: 4 DAYS

## 2024-06-24 ASSESSMENT — SOCIAL DETERMINANTS OF HEALTH (SDOH)
HOW OFTEN DO YOU GET TOGETHER WITH FRIENDS OR RELATIVES?: ONCE A WEEK
HOW OFTEN DO YOU GET TOGETHER WITH FRIENDS OR RELATIVES?: ONCE A WEEK

## 2024-06-24 ASSESSMENT — PAIN SCALES - GENERAL: PAINLEVEL: NO PAIN (0)

## 2024-06-24 ASSESSMENT — ENCOUNTER SYMPTOMS: LEG PAIN: 1

## 2024-06-24 NOTE — PROGRESS NOTES
"  Assessment & Plan     Nocturnal muscle cramp  Acute, likely idiopathic muscle cramps. Labs as below to rule out metabolic abnormalities. Discussed routine stretching, B complex vitamins. If spreading or migrating, recommend EMG or neuro eval.    - TSH with free T4 reflex  - Basic metabolic panel  (Ca, Cl, CO2, Creat, Gluc, K, Na, BUN)  - CK total  - TSH with free T4 reflex  - Basic metabolic panel  (Ca, Cl, CO2, Creat, Gluc, K, Na, BUN)  - CK total    Cervical cancer screening  No concerning findings.   - Gynecologic Cytology (Pap) and HPV - Recommended Age 30-65 Years      Patient has been advised of split billing requirements and indicates understanding: Yes    I spent a total of 20 minutes on the day of the visit.   Time spent by me doing chart review, history and exam, documentation and further activities per the note      Counseling  Appropriate preventive services were discussed with this patient, including applicable screening as appropriate for fall prevention, nutrition, physical activity, Tobacco-use cessation, weight loss and cognition.  Checklist reviewing preventive services available has been given to the patient.  Reviewed patient's diet, addressing concerns and/or questions.       See Patient Instructions    Mitra Reyna is a 58 year old, presenting for the following health issues:  Gyn Exam and Leg Pain (Left leg \"seizes up\" and \"spasms\" )        6/24/2024    10:05 AM   Additional Questions   Roomed by JOLIE Colindres   Accompanied by Self     History of Present Illness       Reason for visit:  PapShe exercises with enough effort to increase her heart rate 20 to 29 minutes per day.  She exercises with enough effort to increase her heart rate 5 days per week.   She is taking medications regularly.     Leg Pain  -Ongoing for several months  -Left leg only  -Occurs in the quad, comes and goes. Has stayed consistent in nature and intensity.   -Not worse at night   -She did have a flare of sciatica " "but no numbness, tingling, burning pain.       Review of Systems  Constitutional, HEENT, cardiovascular, pulmonary, gi and gu systems are negative, except as otherwise noted.      Objective    /75 (BP Location: Right arm, Patient Position: Sitting, Cuff Size: Adult Regular)   Pulse 71   Temp 98  F (36.7  C) (Oral)   Resp 18   Ht 1.575 m (5' 2\")   Wt 59.1 kg (130 lb 6.4 oz)   LMP  (LMP Unknown)   SpO2 96%   BMI 23.85 kg/m    Body mass index is 23.85 kg/m .  Physical Exam  Exam conducted with a chaperone present.   Chest:      Chest wall: No mass, lacerations or deformity.   Breasts:     Moy Score is 5.      Right: Normal.      Left: Normal.   Genitourinary:     Urethra: No prolapse.      Vagina: Normal.      Cervix: Normal.   Lymphadenopathy:      Upper Body:      Right upper body: No axillary or pectoral adenopathy.      Left upper body: No axillary or pectoral adenopathy.   Neurological:      Sensory: Sensation is intact.      Motor: Motor function is intact.      Deep Tendon Reflexes:      Reflex Scores:       Patellar reflexes are 2+ on the right side and 2+ on the left side.       Achilles reflexes are 2+ on the right side and 2+ on the left side.           No results found for this or any previous visit (from the past 24 hour(s)).        Signed Electronically by: OTTONIEL HARTLEY DO    "

## 2024-06-25 LAB
HPV HR 12 DNA CVX QL NAA+PROBE: NEGATIVE
HPV16 DNA CVX QL NAA+PROBE: NEGATIVE
HPV18 DNA CVX QL NAA+PROBE: NEGATIVE
HUMAN PAPILLOMA VIRUS FINAL DIAGNOSIS: NORMAL

## 2024-06-25 NOTE — RESULT ENCOUNTER NOTE
Hello -    Here are my comments about your recent results:  -Kidney function (GFR) is normal.  -Sodium is decreased.  ADVISE: Continuing to monitor as it appears to be your baseline.  -Potassium is normal.  -Calcium is normal.  -Glucose (diabetic screening test) is normal.  -TSH (thyroid stimulating hormone) level is normal which indicates normal thyroid function.  -CK (muscle enzyme test) is normal.  For additional lab test information, labtestsonline.org is an excellent reference..    Please let us know if you have any questions or concerns.     Regards,  OTTONIEL HARTLEY, DO

## 2024-06-29 LAB
BKR LAB AP GYN ADEQUACY: NORMAL
BKR LAB AP GYN INTERPRETATION: NORMAL
BKR LAB AP PREVIOUS ABNORMAL: NORMAL
PATH REPORT.COMMENTS IMP SPEC: NORMAL
PATH REPORT.COMMENTS IMP SPEC: NORMAL
PATH REPORT.RELEVANT HX SPEC: NORMAL

## 2024-09-17 DIAGNOSIS — I10 ESSENTIAL HYPERTENSION, BENIGN: ICD-10-CM

## 2024-09-17 DIAGNOSIS — I10 ESSENTIAL HYPERTENSION: ICD-10-CM

## 2024-09-17 NOTE — TELEPHONE ENCOUNTER
FAX [facility] Refill Request    lisinopril-hydrochlorothiazide (ZESTORETIC) 20-12.5 MG tablet

## 2024-09-18 RX ORDER — LISINOPRIL AND HYDROCHLOROTHIAZIDE 12.5; 2 MG/1; MG/1
1 TABLET ORAL DAILY
Qty: 90 TABLET | Refills: 0 | Status: SHIPPED | OUTPATIENT
Start: 2024-09-18

## 2024-09-20 ENCOUNTER — ANCILLARY PROCEDURE (OUTPATIENT)
Dept: MAMMOGRAPHY | Facility: CLINIC | Age: 59
End: 2024-09-20
Attending: FAMILY MEDICINE
Payer: COMMERCIAL

## 2024-09-20 DIAGNOSIS — Z12.31 VISIT FOR SCREENING MAMMOGRAM: ICD-10-CM

## 2024-09-20 PROCEDURE — 77063 BREAST TOMOSYNTHESIS BI: CPT | Mod: TC | Performed by: STUDENT IN AN ORGANIZED HEALTH CARE EDUCATION/TRAINING PROGRAM

## 2024-09-20 PROCEDURE — 77067 SCR MAMMO BI INCL CAD: CPT | Mod: TC | Performed by: STUDENT IN AN ORGANIZED HEALTH CARE EDUCATION/TRAINING PROGRAM

## 2024-09-23 ENCOUNTER — IMMUNIZATION (OUTPATIENT)
Dept: FAMILY MEDICINE | Facility: CLINIC | Age: 59
End: 2024-09-23
Payer: COMMERCIAL

## 2024-09-23 PROCEDURE — 90471 IMMUNIZATION ADMIN: CPT

## 2024-09-23 PROCEDURE — 90673 RIV3 VACCINE NO PRESERV IM: CPT

## 2024-09-23 PROCEDURE — 91320 SARSCV2 VAC 30MCG TRS-SUC IM: CPT

## 2024-09-23 PROCEDURE — 90480 ADMN SARSCOV2 VAC 1/ONLY CMP: CPT

## 2024-10-03 NOTE — PROGRESS NOTES
Virtual Visit Details    Type of service:  Video Visit   Video Start Time: 7:59 AM  Video End Time:8:24 AM    Originating Location (pt. Location): Home    Distant Location (provider location):  On-site  Platform used for Video Visit: [facility]    GASTROENTEROLOGY Follow-up VIDEO VISIT    CC/REFERRING MD:    Dhara Márquez    REASON FOR CONSULTATION:   Jill Nicole for   Chief Complaint   Patient presents with    Video Visit     Recheck       HISTORY OF PRESENT ILLNESS:    Maribell Cabrera is a 59 year old female who is being evaluated via a billable video visit for follow up on indigestion, bloating, GERD.     Initial history 5/2024:   Yesterday she reports she took a nap after a 1/2 glass of water, no food for over 2 hours and immediately felt reflux and nauseous. Has minor heartburn. Reflux started last fall for the first time. No change in diet. Was taking tums, stopped eating right before bed. Has had postprandial bloating lower belly, belching, gas, flatulence. Bending forward while gardening causes regurgitation. Rare acid brash.  No nocturnal awakening but dealing with insomnia. Doing a sleep test tonight at home. No vomiting. Nausea is fairly mild. Still able to eat normally. No early satiety. No dysphagia. Feels her esophagus spasms in the mid neck as food or liquid is going down, since the fall. Pulmonologist noted a narrow airway. She tends to be on the constipated side, started a fiber supplement every morning. She will skip up to 3 days without a bowel movement. Skips a day or two per week. Stools are bristol type 3-4. Not a lot of straining, unless its been a couple of days. No blood or black. Weight has been stable.  No chewing gum, and doesn't use straws. Usually finishes her meal last.      Aggravating - bending forward, spicy foods  Alleviating/treatments tried - tums as needed, famotidine qAM for a couple of month was helpful      Family history- Dad passed away from a GIST tumor  diagnosed at age 79, sisters with GERD, no IBD, celiac, or GI malignancies. Some third degree relatives (maternal great uncle's son) with esophageal cancer   NSAID use-- rarely. Did take a lot of Advil in her 20s.  Alcohol use-- rarely   Hx abd surgeries-- none   Hx colonoscopy -- 2016 with hyperplastic polyp recommended to repeat in 10 years    Interval history 10/2024:   Maribell reports he and her  were both diagnosed with sleep apnea over the summer. Now has a CPAP.     Completed 2 months of omeprazole and her reflux has gone away. Has not needed famotidine. Taking tums if she has indigestion about once per week. Hasn't noticed if tums helps with bloating. It helps with epigastric discomfort.     Has had some indigestion a few times with bloating and gas since our last visit. These symptoms are not too bothersome. The bloating involves the whole abdomen. Correlates with fibrous foods. The bloating is not painful. These symptoms were significantly better on the omeprazole.     Bowel movements have been good. She has forgotten to take the Miralax recently but still pretty regular. She has been eating a lot of fiber in the diet. Plans to start fiber supplement in the winter when she eats fewer veggies. Having daily stools that are soft and easy to pass.     Swallowing is good. She she has not had any issues since our last visit. Has had some dry mouth and bad breath. Seems to come and go. Has had a dental visit in the last 6 months. Does not have dry eyes. She is on Claritin.       I have reviewed and updated the patient's Past Medical History, Social History, Family History and Medication List.    Exam:    General appearance:  Healthy appearing adult, in no acute distress  Eyes:  Sclera anicteric  Ears, nose, mouth and throat:  No obvious external lesions of ears and nose.  Hearing intact  Neck:  Symmetric, No obvious external lesions  Respiratory:  Normal respiration, no use of accessory muscles   MSK:  No  visual upper extremity, neck or facial muscle atrophy  Psychiatric:  Oriented to person, place and time, Appropriate mood and affect.   Neurologic:  Peripheral muscle function and dexterity appear to be intact      PERTINENT STUDIES have been reviewed.    ASSESSMENT/PLAN:    Maribell Cabrera is a 59 year old female who presents for follow up of GERD and dyspepsia symptoms     1. Regurgitation of food  2. Bloating  3. Belching    59 year old female with regurgitation, abdominal fullness, postprandial lower abdominal bloating, belching and intermittent heartburn that started last fall. She finds symptoms worsen with bending forward while gardening, and when lying down after eating or drinking, even if she only has water. She believes she may have had a hiatal hernia as a baby that was not treated surgically. She has a family history of GIST in her father, who passed away from this. He was diagnosed at age 79. Her sisters have GERD, otherwise no other pertinent family history. She has tried 2 months on famotidine which was helpful but discontinued to avoid any potential side effects. She has tried avoiding spicy foods, cutting back on coffee and eating earlier in the day, smaller meals.      She does have a bit of catching in her upper esophagus with eating on rarely. We discussed the option of doing a video swallow study and esophagram but she would like to hold off on this.      She does tend to be constipated, skipping up to 3 days without a bowel movement. Typically has BSC type 3-4 stools. Takes 1-2 psyllium capsules daily.     Differential diagnosis include GERD, dyspepsia, peptic ulcer disease, constipation, hiatal hernia, H pylori.    We proceeded with miralax for constipation and 2 months of omeprazole. She found this to be significantly helpful and she is feeling much better. Still has some gas and bloating which she attributes to her high fiber diet. She has not yet tried FD Brenna and does not take fiber  supplements.      We reviewed the following plan:   - FD Brenna and Iberogast might give you more relief of indigestion than the tums   - You could step up to famotidine and use this on an as needed   - Let me know if dry mouth doesn't get better off of Claritin this winter let me know and we can check Sjogren's labs (dry mouth dry eye syndrome)  - Follow up in 6 months      Video-Visit Details  Video Visit Time: 25 min  Type of service:  Video Visit  Originating Location (pt. Location): Home    Distant Location (provider location):  On-site  Platform used for Video Visit: [employer]    35 minutes spent on the date of the encounter doing chart review, history and exam, documentation and further activities as noted above.    Jill Nicole PA-C  Division of Gastroenterology, Hepatology, and Nutrition  [facility]     RTC 6 months

## 2024-10-04 ENCOUNTER — VIRTUAL VISIT (OUTPATIENT)
Dept: GASTROENTEROLOGY | Facility: CLINIC | Age: 59
End: 2024-10-04
Attending: STUDENT IN AN ORGANIZED HEALTH CARE EDUCATION/TRAINING PROGRAM
Payer: COMMERCIAL

## 2024-10-04 VITALS — BODY MASS INDEX: 23.23 KG/M2 | WEIGHT: 127 LBS

## 2024-10-04 DIAGNOSIS — R14.2 BELCHING: ICD-10-CM

## 2024-10-04 DIAGNOSIS — R11.10 REGURGITATION OF FOOD: ICD-10-CM

## 2024-10-04 DIAGNOSIS — R14.0 BLOATING: ICD-10-CM

## 2024-10-04 PROCEDURE — 99214 OFFICE O/P EST MOD 30 MIN: CPT | Mod: 95 | Performed by: STUDENT IN AN ORGANIZED HEALTH CARE EDUCATION/TRAINING PROGRAM

## 2024-10-04 ASSESSMENT — PAIN SCALES - GENERAL: PAINLEVEL: NO PAIN (0)

## 2024-10-04 NOTE — NURSING NOTE
Current patient location: Patient declined to provide     Is the patient currently in the state of [place]? YES    Visit mode:VIDEO    If the visit is dropped, the patient can be reconnected by: VIDEO VISIT: Text to cell phone:   Telephone Information:   Mobile [phone]       Will anyone else be joining the visit? NO  (If patient encounters technical issues they should call [phone]    Are changes needed to the allergy or medication list? No    Are refills needed on medications prescribed by this physician? NO    Rooming Documentation:  Questionnaire(s) not pre-assigned    Reason for visit: Video Visit (Recheck)    Sarah BROCK

## 2024-10-04 NOTE — PATIENT INSTRUCTIONS
It was a pleasure meeting with you today and discussing your healthcare plan. Below is a summary of what we covered:    - FD Brenna and Iberogast might give you more relief of indigestion than the tums   - You could step up to famotidine and use this on an as needed   - Let me know if dry mouth doesn't get better off of Claritin this winter let me know and we can check Sjogren's labs (dry mouth dry eye syndrome)  - Follow up in 6 months  -  If you have any questions, please don't hesitate to contact me through our GI RN Clinic Coordinator, Betzy Darnell, at [phone].      Please see below for any additional questions and scheduling guidelines.    Sign up for [employer]: [employer] patient portal serves as a secure platform for accessing your medical records from the [facility]. Additionally, [employer] facilitates easy, timely, and secure messaging with your care team. If you have not signed up, you may do so by using the provided code or calling [phone].    Coordinating your care after your visit:  There are multiple options for scheduling your follow-up care based on your provider's recommendation.    How do I schedule a follow-up clinic appointment:   After your appointment, you may receive scheduling assistance with the Clinic Coordinators by having a seat in the waiting room and a Clinic Coordinator will call you up to schedule.  Virtual visits or after you leave the clinic:  Your provider has placed a follow-up order in the [employer] portal for scheduling your return appointment. A member of the scheduling team will contact you to schedule.  [employer] Scheduling: Timely scheduling through [employer] is advised to ensure appointment availability.   Call to schedule: You may schedule your follow-up appointment(s) by calling [phone], option 1.    How do I schedule my endoscopy or colonoscopy procedure:  If a procedure, such as a colonoscopy or upper endoscopy was ordered by your provider, the scheduling  team will contact you to schedule this procedure. Or you may choose to call to schedule at   [phone], option 2.  Please allow 20-30 minutes when scheduling a procedure.    How do I get my blood work done? To get your blood work done, you need to schedule a lab appointment at an [facility]. There are multiple ways to schedule:   At the clinic: The Clinic Coordinator you meet after your visit can help you schedule a lab appointment.   Dick [employer] Bro scheduling: [employer] offers online lab scheduling at all [facility] laboratory locations.   Call to schedule: You can call [phone] to schedule your lab appointment.    How do I schedule my imaging study: To schedule imaging studies, such as CT scans, ultrasounds, MRIs, or X-rays, contact Imaging Services at [phone].    How do I schedule a referral to another doctor: If your provider recommended a referral to another specialist(s), the referral order was placed by your provider. You will receive a phone call to schedule this referral, or you may choose to call the number attached to the referral to self-schedule.    For Post-Visit Question(s):  For any inquiries following today's visit:  Please utilize [employer] messaging and allow 48 hours for reply or contact the Call Center during normal business hours at [phone], option 3.  For Emergent After-hours questions, contact the On-Call GI Fellow through the [facility]  at [phone].  In addition, you may contact your Nurse directly using the provided contact information.    Test Results:  Test results will be accessible via [employer] in compliance with the 21st Century Cures Act. This means that your results will be available to you at the same time as your provider. Often you may see your results before your provider does. Results are reviewed by staff within two weeks with communication follow-up. Results may be released in the patient portal prior to your care team  review.    Prescription Refill(s):  Medication prescribed by your provider will be addressed during your visit. For future refills, please coordinate with your pharmacy. If you have not had a recent clinic visit or routine labs, for your safety, your provider may not be able to refill your prescription.

## 2024-10-04 NOTE — LETTER
10/4/2024      Maribell Cabrera  [address] [place] [address]      Dear Colleague,    Thank you for referring your patient, Maribell Cabrera, to the [facility] [place]. Please see a copy of my visit note below.    Virtual Visit Details    Type of service:  Video Visit   Video Start Time: 7:59 AM  Video End Time:8:24 AM    Originating Location (pt. Location): Home    Distant Location (provider location):  On-site  Platform used for Video Visit: [facility]    GASTROENTEROLOGY Follow-up VIDEO VISIT    CC/REFERRING MD:    Dhara Márquez    REASON FOR CONSULTATION:   Jill Nicole for   Chief Complaint   Patient presents with     Video Visit     Recheck       HISTORY OF PRESENT ILLNESS:    Maribell Cabrera is a 59 year old female who is being evaluated via a billable video visit for follow up on indigestion, bloating, GERD.     Initial history 5/2024:   Yesterday she reports she took a nap after a 1/2 glass of water, no food for over 2 hours and immediately felt reflux and nauseous. Has minor heartburn. Reflux started last fall for the first time. No change in diet. Was taking tums, stopped eating right before bed. Has had postprandial bloating lower belly, belching, gas, flatulence. Bending forward while gardening causes regurgitation. Rare acid brash.  No nocturnal awakening but dealing with insomnia. Doing a sleep test tonight at home. No vomiting. Nausea is fairly mild. Still able to eat normally. No early satiety. No dysphagia. Feels her esophagus spasms in the mid neck as food or liquid is going down, since the fall. Pulmonologist noted a narrow airway. She tends to be on the constipated side, started a fiber supplement every morning. She will skip up to 3 days without a bowel movement. Skips a day or two per week. Stools are bristol type 3-4. Not a lot of straining, unless its been a couple of days. No blood or black. Weight has been stable.  No chewing gum,  and doesn't use straws. Usually finishes her meal last.      Aggravating - bending forward, spicy foods  Alleviating/treatments tried - tums as needed, famotidine qAM for a couple of month was helpful      Family history- Dad passed away from a GIST tumor diagnosed at age 79, sisters with GERD, no IBD, celiac, or GI malignancies. Some third degree relatives (maternal great uncle's son) with esophageal cancer   NSAID use-- rarely. Did take a lot of Advil in her 20s.  Alcohol use-- rarely   Hx abd surgeries-- none   Hx colonoscopy -- 2016 with hyperplastic polyp recommended to repeat in 10 years    Interval history 10/2024:   Maribell reports he and her  were both diagnosed with sleep apnea over the summer. Now has a CPAP.     Completed 2 months of omeprazole and her reflux has gone away. Has not needed famotidine. Taking tums if she has indigestion about once per week. Hasn't noticed if tums helps with bloating. It helps with epigastric discomfort.     Has had some indigestion a few times with bloating and gas since our last visit. These symptoms are not too bothersome. The bloating involves the whole abdomen. Correlates with fibrous foods. The bloating is not painful. These symptoms were significantly better on the omeprazole.     Bowel movements have been good. She has forgotten to take the Miralax recently but still pretty regular. She has been eating a lot of fiber in the diet. Plans to start fiber supplement in the winter when she eats fewer veggies. Having daily stools that are soft and easy to pass.     Swallowing is good. She she has not had any issues since our last visit. Has had some dry mouth and bad breath. Seems to come and go. Has had a dental visit in the last 6 months. Does not have dry eyes. She is on Claritin.       I have reviewed and updated the patient's Past Medical History, Social History, Family History and Medication List.    Exam:    General appearance:  Healthy appearing adult, in no  acute distress  Eyes:  Sclera anicteric  Ears, nose, mouth and throat:  No obvious external lesions of ears and nose.  Hearing intact  Neck:  Symmetric, No obvious external lesions  Respiratory:  Normal respiration, no use of accessory muscles   MSK:  No visual upper extremity, neck or facial muscle atrophy  Psychiatric:  Oriented to person, place and time, Appropriate mood and affect.   Neurologic:  Peripheral muscle function and dexterity appear to be intact      PERTINENT STUDIES have been reviewed.    ASSESSMENT/PLAN:    Maribell Cabrera is a 59 year old female who presents for follow up of GERD and dyspepsia symptoms     1. Regurgitation of food  2. Bloating  3. Belching    59 year old female with regurgitation, abdominal fullness, postprandial lower abdominal bloating, belching and intermittent heartburn that started last fall. She finds symptoms worsen with bending forward while gardening, and when lying down after eating or drinking, even if she only has water. She believes she may have had a hiatal hernia as a baby that was not treated surgically. She has a family history of GIST in her father, who passed away from this. He was diagnosed at age 79. Her sisters have GERD, otherwise no other pertinent family history. She has tried 2 months on famotidine which was helpful but discontinued to avoid any potential side effects. She has tried avoiding spicy foods, cutting back on coffee and eating earlier in the day, smaller meals.      She does have a bit of catching in her upper esophagus with eating on rarely. We discussed the option of doing a video swallow study and esophagram but she would like to hold off on this.      She does tend to be constipated, skipping up to 3 days without a bowel movement. Typically has BSC type 3-4 stools. Takes 1-2 psyllium capsules daily.     Differential diagnosis include GERD, dyspepsia, peptic ulcer disease, constipation, hiatal hernia, H pylori.    We proceeded with miralax  for constipation and 2 months of omeprazole. She found this to be significantly helpful and she is feeling much better. Still has some gas and bloating which she attributes to her high fiber diet. She has not yet tried FD Brenna and does not take fiber supplements.      We reviewed the following plan:   - FD Brenna and Iberogast might give you more relief of indigestion than the tums   - You could step up to famotidine and use this on an as needed   - Let me know if dry mouth doesn't get better off of Claritin this winter let me know and we can check Sjogren's labs (dry mouth dry eye syndrome)  - Follow up in 6 months      Video-Visit Details  Video Visit Time: 25 min  Type of service:  Video Visit  Originating Location (pt. Location): Home    Distant Location (provider location):  On-site  Platform used for Video Visit: [employer]    35 minutes spent on the date of the encounter doing chart review, history and exam, documentation and further activities as noted above.    Jill Nicole PA-C  Division of Gastroenterology, Hepatology, and Nutrition  [facility]     RTC 6 months      Again, thank you for allowing me to participate in the care of your patient.        Sincerely,        Jill Niocle PA-C

## 2024-12-16 DIAGNOSIS — I10 ESSENTIAL HYPERTENSION: ICD-10-CM

## 2024-12-16 DIAGNOSIS — I10 ESSENTIAL HYPERTENSION, BENIGN: ICD-10-CM

## 2024-12-16 RX ORDER — LISINOPRIL AND HYDROCHLOROTHIAZIDE 12.5; 2 MG/1; MG/1
1 TABLET ORAL DAILY
Qty: 90 TABLET | Refills: 1 | Status: SHIPPED | OUTPATIENT
Start: 2024-12-16

## 2024-12-16 NOTE — TELEPHONE ENCOUNTER
FAX Prescription Refill Request    lisinopril-hydrochlorothiazide (ZESTORETIC) 20-12.5 MG tablet     Last Visit with PCP:  06/24/24  Next Visit with PCP: nothing scheduled

## 2025-04-23 ENCOUNTER — OFFICE VISIT (OUTPATIENT)
Dept: FAMILY MEDICINE | Facility: CLINIC | Age: 60
End: 2025-04-23
Payer: COMMERCIAL

## 2025-04-23 ENCOUNTER — PATIENT OUTREACH (OUTPATIENT)
Dept: ONCOLOGY | Facility: CLINIC | Age: 60
End: 2025-04-23

## 2025-04-23 VITALS
DIASTOLIC BLOOD PRESSURE: 62 MMHG | OXYGEN SATURATION: 100 % | RESPIRATION RATE: 15 BRPM | TEMPERATURE: 98.3 F | HEART RATE: 53 BPM | WEIGHT: 133 LBS | SYSTOLIC BLOOD PRESSURE: 122 MMHG | HEIGHT: 65 IN | BODY MASS INDEX: 22.16 KG/M2

## 2025-04-23 DIAGNOSIS — J30.9 ALLERGIC SINUSITIS: ICD-10-CM

## 2025-04-23 DIAGNOSIS — Z80.3 FAMILY HISTORY OF BREAST CANCER IN FIRST DEGREE RELATIVE: ICD-10-CM

## 2025-04-23 DIAGNOSIS — Z78.0 POST-MENOPAUSAL: ICD-10-CM

## 2025-04-23 DIAGNOSIS — E78.00 PRIMARY HYPERCHOLESTEROLEMIA: ICD-10-CM

## 2025-04-23 DIAGNOSIS — Z23 NEED FOR VACCINATION: ICD-10-CM

## 2025-04-23 DIAGNOSIS — R00.2 PALPITATIONS: ICD-10-CM

## 2025-04-23 DIAGNOSIS — Z00.00 ROUTINE GENERAL MEDICAL EXAMINATION AT A HEALTH CARE FACILITY: Primary | ICD-10-CM

## 2025-04-23 DIAGNOSIS — Z12.31 ENCOUNTER FOR SCREENING MAMMOGRAM FOR BREAST CANCER: ICD-10-CM

## 2025-04-23 DIAGNOSIS — I10 ESSENTIAL HYPERTENSION: ICD-10-CM

## 2025-04-23 DIAGNOSIS — E56.9 VITAMIN DEFICIENCY: ICD-10-CM

## 2025-04-23 PROBLEM — E55.9 VITAMIN D DEFICIENCY: Status: RESOLVED | Noted: 2023-12-22 | Resolved: 2025-04-23

## 2025-04-23 LAB
ATRIAL RATE - MUSE: 49 BPM
ATRIAL RATE - MUSE: 52 BPM
DIASTOLIC BLOOD PRESSURE - MUSE: NORMAL MMHG
DIASTOLIC BLOOD PRESSURE - MUSE: NORMAL MMHG
ERYTHROCYTE [DISTWIDTH] IN BLOOD BY AUTOMATED COUNT: 12.2 % (ref 10–15)
HCT VFR BLD AUTO: 40.5 % (ref 35–47)
HGB BLD-MCNC: 13.6 G/DL (ref 11.7–15.7)
INTERPRETATION ECG - MUSE: NORMAL
INTERPRETATION ECG - MUSE: NORMAL
MCH RBC QN AUTO: 29.8 PG (ref 26.5–33)
MCHC RBC AUTO-ENTMCNC: 33.6 G/DL (ref 31.5–36.5)
MCV RBC AUTO: 89 FL (ref 78–100)
P AXIS - MUSE: 159 DEGREES
P AXIS - MUSE: 23 DEGREES
PLATELET # BLD AUTO: 265 10E3/UL (ref 150–450)
PR INTERVAL - MUSE: 124 MS
PR INTERVAL - MUSE: 126 MS
QRS DURATION - MUSE: 88 MS
QRS DURATION - MUSE: 90 MS
QT - MUSE: 468 MS
QT - MUSE: 468 MS
QTC - MUSE: 422 MS
QTC - MUSE: 435 MS
R AXIS - MUSE: 156 DEGREES
R AXIS - MUSE: 29 DEGREES
RBC # BLD AUTO: 4.56 10E6/UL (ref 3.8–5.2)
SYSTOLIC BLOOD PRESSURE - MUSE: NORMAL MMHG
SYSTOLIC BLOOD PRESSURE - MUSE: NORMAL MMHG
T AXIS - MUSE: 145 DEGREES
T AXIS - MUSE: 35 DEGREES
VENTRICULAR RATE- MUSE: 49 BPM
VENTRICULAR RATE- MUSE: 52 BPM
WBC # BLD AUTO: 4.5 10E3/UL (ref 4–11)

## 2025-04-23 PROCEDURE — 90746 HEPB VACCINE 3 DOSE ADULT IM: CPT | Performed by: NURSE PRACTITIONER

## 2025-04-23 PROCEDURE — 93010 ELECTROCARDIOGRAM REPORT: CPT | Mod: 76 | Performed by: INTERNAL MEDICINE

## 2025-04-23 PROCEDURE — 3078F DIAST BP <80 MM HG: CPT | Performed by: NURSE PRACTITIONER

## 2025-04-23 PROCEDURE — 90677 PCV20 VACCINE IM: CPT | Performed by: NURSE PRACTITIONER

## 2025-04-23 PROCEDURE — 90471 IMMUNIZATION ADMIN: CPT | Performed by: NURSE PRACTITIONER

## 2025-04-23 PROCEDURE — 3074F SYST BP LT 130 MM HG: CPT | Performed by: NURSE PRACTITIONER

## 2025-04-23 PROCEDURE — 80048 BASIC METABOLIC PNL TOTAL CA: CPT | Performed by: NURSE PRACTITIONER

## 2025-04-23 PROCEDURE — 85027 COMPLETE CBC AUTOMATED: CPT | Performed by: NURSE PRACTITIONER

## 2025-04-23 PROCEDURE — 36415 COLL VENOUS BLD VENIPUNCTURE: CPT | Performed by: NURSE PRACTITIONER

## 2025-04-23 PROCEDURE — 84443 ASSAY THYROID STIM HORMONE: CPT | Performed by: NURSE PRACTITIONER

## 2025-04-23 PROCEDURE — 80061 LIPID PANEL: CPT | Performed by: NURSE PRACTITIONER

## 2025-04-23 PROCEDURE — 90472 IMMUNIZATION ADMIN EACH ADD: CPT | Performed by: NURSE PRACTITIONER

## 2025-04-23 PROCEDURE — 99213 OFFICE O/P EST LOW 20 MIN: CPT | Mod: 25 | Performed by: NURSE PRACTITIONER

## 2025-04-23 PROCEDURE — 99396 PREV VISIT EST AGE 40-64: CPT | Mod: 25 | Performed by: NURSE PRACTITIONER

## 2025-04-23 PROCEDURE — 93005 ELECTROCARDIOGRAM TRACING: CPT | Mod: 76 | Performed by: NURSE PRACTITIONER

## 2025-04-23 PROCEDURE — 82306 VITAMIN D 25 HYDROXY: CPT | Performed by: NURSE PRACTITIONER

## 2025-04-23 RX ORDER — CHOLECALCIFEROL (VITAMIN D3) 50 MCG
1 TABLET ORAL DAILY
COMMUNITY

## 2025-04-23 RX ORDER — LISINOPRIL AND HYDROCHLOROTHIAZIDE 12.5; 2 MG/1; MG/1
1 TABLET ORAL DAILY
Qty: 90 TABLET | Refills: 3 | Status: SHIPPED | OUTPATIENT
Start: 2025-04-23

## 2025-04-23 RX ORDER — FLUTICASONE PROPIONATE 50 MCG
1 SPRAY, SUSPENSION (ML) NASAL DAILY
Qty: 16 G | Refills: 0 | Status: SHIPPED | OUTPATIENT
Start: 2025-04-23

## 2025-04-23 SDOH — HEALTH STABILITY: PHYSICAL HEALTH: ON AVERAGE, HOW MANY DAYS PER WEEK DO YOU ENGAGE IN MODERATE TO STRENUOUS EXERCISE (LIKE A BRISK WALK)?: 4 DAYS

## 2025-04-23 ASSESSMENT — SOCIAL DETERMINANTS OF HEALTH (SDOH): HOW OFTEN DO YOU GET TOGETHER WITH FRIENDS OR RELATIVES?: ONCE A WEEK

## 2025-04-23 ASSESSMENT — PATIENT HEALTH QUESTIONNAIRE - PHQ9
SUM OF ALL RESPONSES TO PHQ QUESTIONS 1-9: 9
10. IF YOU CHECKED OFF ANY PROBLEMS, HOW DIFFICULT HAVE THESE PROBLEMS MADE IT FOR YOU TO DO YOUR WORK, TAKE CARE OF THINGS AT HOME, OR GET ALONG WITH OTHER PEOPLE: NOT DIFFICULT AT ALL
SUM OF ALL RESPONSES TO PHQ QUESTIONS 1-9: 9

## 2025-04-23 NOTE — PATIENT INSTRUCTIONS
Patient Education   Preventive Care Advice   This is general advice given by our system to help you stay healthy. However, your care team may have specific advice just for you. Please talk to your care team about your preventive care needs.  Nutrition  Eat 5 or more servings of fruits and vegetables each day.  Try wheat bread, brown rice and whole grain pasta (instead of white bread, rice, and pasta).  Get enough calcium and vitamin D. Check the label on foods and aim for 100% of the RDA (recommended daily allowance).  Lifestyle  Exercise at least 150 minutes each week  (30 minutes a day, 5 days a week).  Do muscle strengthening activities 2 days a week. These help control your weight and prevent disease.  No smoking.  Wear sunscreen to prevent skin cancer.  Have a dental exam and cleaning every 6 months.  Yearly exams  See your health care team every year to talk about:  Any changes in your health.  Any medicines your care team has prescribed.  Preventive care, family planning, and ways to prevent chronic diseases.  Shots (vaccines)   HPV shots (up to age 26), if you've never had them before.  Hepatitis B shots (up to age 59), if you've never had them before.  COVID-19 shot: Get this shot when it's due.  Flu shot: Get a flu shot every year.  Tetanus shot: Get a tetanus shot every 10 years.  Pneumococcal, hepatitis A, and RSV shots: Ask your care team if you need these based on your risk.  Shingles shot (for age 50 and up)  General health tests  Diabetes screening:  Starting at age 35, Get screened for diabetes at least every 3 years.  If you are younger than age 35, ask your care team if you should be screened for diabetes.  Cholesterol test: At age 39, start having a cholesterol test every 5 years, or more often if advised.  Bone density scan (DEXA): At age 50, ask your care team if you should have this scan for osteoporosis (brittle bones).  Hepatitis C: Get tested at least once in your life.  STIs (sexually  transmitted infections)  Before age 24: Ask your care team if you should be screened for STIs.  After age 24: Get screened for STIs if you're at risk. You are at risk for STIs (including HIV) if:  You are sexually active with more than one person.  You don't use condoms every time.  You or a partner was diagnosed with a sexually transmitted infection.  If you are at risk for HIV, ask about PrEP medicine to prevent HIV.  Get tested for HIV at least once in your life, whether you are at risk for HIV or not.  Cancer screening tests  Cervical cancer screening: If you have a cervix, begin getting regular cervical cancer screening tests starting at age 21.  Breast cancer scan (mammogram): If you've ever had breasts, begin having regular mammograms starting at age 40. This is a scan to check for breast cancer.  Colon cancer screening: It is important to start screening for colon cancer at age 45.  Have a colonoscopy test every 10 years (or more often if you're at risk) Or, ask your provider about stool tests like a FIT test every year or Cologuard test every 3 years.  To learn more about your testing options, visit:   .  For help making a decision, visit:   https://bit.ly/or62170.  Prostate cancer screening test: If you have a prostate, ask your care team if a prostate cancer screening test (PSA) at age 55 is right for you.  Lung cancer screening: If you are a current or former smoker ages 50 to 80, ask your care team if ongoing lung cancer screenings are right for you.  For informational purposes only. Not to replace the advice of your health care provider. Copyright   2023 [facility]. All rights reserved. Clinically reviewed by the [facility]. [employer] 330755 - REV 01/24.  Learning About Stress  What is stress?     Stress is your body's response to a hard situation. Your body can have a physical, emotional, or mental response. Stress is a fact of life for most people, and it  affects everyone differently. What causes stress for you may not be stressful for someone else.  A lot of things can cause stress. You may feel stress when you go on a job interview, take a test, or run a race. This kind of short-term stress is normal and even useful. It can help you if you need to work hard or react quickly. For example, stress can help you finish an important job on time.  Long-term stress is caused by ongoing stressful situations or events. Examples of long-term stress include long-term health problems, ongoing problems at work, or conflicts in your family. Long-term stress can harm your health.  How does stress affect your health?  When you are stressed, your body responds as though you are in danger. It makes hormones that speed up your heart, make you breathe faster, and give you a burst of energy. This is called the fight-or-flight stress response. If the stress is over quickly, your body goes back to normal and no harm is done.  But if stress happens too often or lasts too long, it can have bad effects. Long-term stress can make you more likely to get sick, and it can make symptoms of some diseases worse. If you tense up when you are stressed, you may develop neck, shoulder, or low back pain. Stress is linked to high blood pressure and heart disease.  Stress also harms your emotional health. It can make you moreno, tense, or depressed. Your relationships may suffer, and you may not do well at work or school.  What can you do to manage stress?  You can try these things to help manage stress:   Do something active. Exercise or activity can help reduce stress. Walking is a great way to get started. Even everyday activities such as housecleaning or yard work can help.  Try yoga or karan chi. These techniques combine exercise and meditation. You may need some training at first to learn them.  Do something you enjoy. For example, listen to music or go to a movie. Practice your hobby or do volunteer  "work.  Meditate. This can help you relax, because you are not worrying about what happened before or what may happen in the future.  Do guided imagery. Imagine yourself in any setting that helps you feel calm. You can use online videos, books, or a teacher to guide you.  Do breathing exercises. For example:  From a standing position, bend forward from the waist with your knees slightly bent. Let your arms dangle close to the floor.  Breathe in slowly and deeply as you return to a standing position. Roll up slowly and lift your head last.  Hold your breath for just a few seconds in the standing position.  Breathe out slowly and bend forward from the waist.  Let your feelings out. Talk, laugh, cry, and express anger when you need to. Talking with supportive friends or family, a counselor, or a lilli leader about your feelings is a healthy way to relieve stress. Avoid discussing your feelings with people who make you feel worse.  Write. It may help to write about things that are bothering you. This helps you find out how much stress you feel and what is causing it. When you know this, you can find better ways to cope.  What can you do to prevent stress?  You might try some of these things to help prevent stress:  Manage your time. This helps you find time to do the things you want and need to do.  Get enough sleep. Your body recovers from the stresses of the day while you are sleeping.  Get support. Your family, friends, and community can make a difference in how you experience stress.  Limit your news feed. Avoid or limit time on social media or news that may make you feel stressed.  Do something active. Exercise or activity can help reduce stress. Walking is a great way to get started.  Where can you learn more?  Go to https://www.[employer].net/patiented  Enter N032 in the search box to learn more about \"Learning About Stress.\"  Current as of: October 24, 2024  Content Version: 14.4 2024-2025 [email or web address].   Care instructions adapted under license by your healthcare professional. If you have questions about a medical condition or this instruction, always ask your healthcare professional. [employer] disclaims any warranty or liability for your use of this information.    Learning About Depression Screening  What is depression screening?  Depression screening is a way to see if you have depression symptoms. It may be done by a doctor or counselor. It's often part of a routine checkup. That's because your mental health is just as important as your physical health.  Depression is a mental health condition that affects how you feel, think, and act. You may:  Have less energy.  Lose interest in your daily activities.  Feel sad and grouchy for a long time.  Depression is very common. It affects people of all ages.  Many things can lead to depression. Some people become depressed after they have a stroke or find out they have a major illness like cancer or heart disease. The death of a loved one or a breakup may lead to depression. It can run in families. Most experts believe that a combination of inherited genes and stressful life events can cause it.  What happens during screening?  You may be asked to fill out a form about your depression symptoms. You and the doctor will discuss your answers. The doctor may ask you more questions to learn more about how you think, act, and feel.  What happens after screening?  If you have symptoms of depression, your doctor will talk to you about your options.  Doctors usually treat depression with medicines or counseling. Often, combining the two works best. Many people don't get help because they think that they'll get over the depression on their own. But people with depression may not get better unless they get treatment.  The cause of depression is not well understood. There may be many factors involved. But if you have depression, it's not your fault.  A serious  "symptom of depression is thinking about death or suicide. If you or someone you care about talks about this or about feeling hopeless, get help right away.  It's important to know that depression can be treated. Medicine, counseling, and self-care may help.  Where can you learn more?  Go to https://www.[facility].net/patiented  Enter T185 in the search box to learn more about \"Learning About Depression Screening.\"  Current as of: July 31, 2024  Content Version: 14.4    7995-7466 [employer].   Care instructions adapted under license by your healthcare professional. If you have questions about a medical condition or this instruction, always ask your healthcare professional. [employer] disclaims any warranty or liability for your use of this information.       "

## 2025-04-23 NOTE — PROGRESS NOTES
New Patient Oncology Nurse Navigator Note     Referring provider: Veronica Arvizu APRN CNP      Referring Clinic/Organization: [facility]      Referred to (specialty:) Genetic Counseling and Cancer Risk Management     Requested provider (if applicable): NA     Date Referral Received: April 23, 2025     Evaluation for:  Z80.3 (ICD-10-CM) - Family history of breast cancer in first degree relative     First degree relative with breast cancer- mother and sister     amily History  Pedigree Relation Problem Comments   Mother (Alive) Breast Cancer (Age: 50)       Father Cancer gastro stromal tumor   Diabetes    Hypertension    Melanoma @79   Snoring       Maternal Grandfather Coronary Artery Disease       Paternal Grandmother Cerebrovascular Disease       Paternal Grandfather Coronary Artery Disease       Sister - joselo (Alive) Breast Cancer (Age: 54) DCIS      Sister - olivia Arthritis    Diverticulosis    GERD       Maternal Uncle Heart Disease          Payor: [employer] / Plan: [employer] [place] / Product Type: HMO /     April 23, 2025  Referral received and reviewed.   Sent to NPS to schedule.     Zora LOWEN, RN, OCN  Oncology Nurse Navigator   [facility]  Cancer Care Service Line   New Patient Hem/Onc Scheduling / Referrals: [phone] (fax: [phone] )     Yes...

## 2025-04-23 NOTE — PROGRESS NOTES
Preventive Care Visit  [facility]  GONSALO Felix CNP, Family Medicine  Apr 23, 2025      Assessment & Plan     Routine general medical examination at a health care facility  Preventive Care Advice was given as reflected on patient instructions  - REVIEW OF HEALTH MAINTENANCE PROTOCOL ORDERS  - Basic metabolic panel  (Ca, Cl, CO2, Creat, Gluc, K, Na, BUN)  - PRIMARY CARE FOLLOW-UP SCHEDULING  Up to date with colonoscopy and pap-smear    Primary hypercholesterolemia  - Lipid panel reflex to direct LDL Non-fasting    Essential hypertension  Well controlled- continue same tx  - lisinopril-hydrochlorothiazide (ZESTORETIC) 20-12.5 MG tablet  Dispense: 90 tablet; Refill: 3    Family history of breast cancer in first degree relative  - Adult Oncology/Hematology  Referral    Encounter for screening mammogram for breast cancer  - MA Screen Bilateral w/Ismael    Post-menopausal  - vitamin D3 (CHOLECALCIFEROL) 50 mcg (2000 units) tablet  - Calcium Carbonate (CALCIUM 500 PO)  - DX Bone Density    Palpitations  - Basic metabolic panel  (Ca, Cl, CO2, Creat, Gluc, K, Na, BUN)  - TSH with free T4 reflex  - CBC with platelets  - EKG 12-lead, tracing only  Discussed heart monitor and patient wishes to observe at this time. If recurrent, go to UC or ED -      Allergic sinusitis  - fluticasone (FLONASE) 50 MCG/ACT nasal spray  Dispense: 16 g; Refill: 0  -Claritin 10mg daily    Need for vaccination  - HEPATITIS B, ADULT 20+ (ENGERIX-B/RECOMBIVAX HB)  - Pneumococcal 20 Valent Conjugate (PCV20)    Vitamin deficiency  - Vitamin D Deficiency          Counseling  Appropriate preventive services were addressed with this patient via screening, questionnaire, or discussion as appropriate for fall prevention, nutrition, physical activity, Tobacco-use cessation, social engagement, weight loss and cognition.  Checklist reviewing preventive services available has been given to the patient.  Reviewed patient's  diet, addressing concerns and/or questions.   She is at risk for psychosocial distress and has been provided with information to reduce risk.   The patient's PHQ-9 score is consistent with mild depression. She was provided with information regarding depression.           Mitra Reyna is a 59 year old, presenting for the following:  Annual Visit (Update any vaccines that's due today; ? 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  Mom recently agreed that she will be moving to assisted living    She is  and has a supportive .    New issues:  Rally bad sinus issues this winter and spring, got a weird pain under the jaw a few times.  About a week ago, had 2 day episodes in which her heart was feeling off/ beating off. She put a pulse ox on and indicated for about 4 min   Abnormal pulse- from a normal pulse went down to 36, then back up. Did that twice. The next day she felt fine. No chest pain. No shortness of breath or dizziness.  Has not recurred since then.                Advance Care Planning    Discussed advance care planning with patient; informed AVS has link to [employer].        4/23/2025   General Health   How would you rate your overall physical health? Good   Feel stress (tense, anxious, or unable to sleep) Very much   (!) STRESS CONCERN      4/23/2025   Nutrition   Three or more servings of calcium each day? (!) I DON'T KNOW   Diet: Regular (no restrictions)   How many servings of fruit and vegetables per day? 4 or more   How many sweetened beverages each day? 0-1         4/23/2025    Exercise   Days per week of moderate/strenous exercise 4 days         4/23/2025   Social Factors   Frequency of gathering with friends or relatives Once a week   Worry food won't last until get money to buy more No   Food not last or not have enough money for food? No   Do you have housing? (Housing is defined as stable permanent housing and does not include staying ouside in a car, in a tent, in an abandoned building, in an overnight shelter, or couch-surfing.) Yes   Are you worried about losing your housing? No   Lack of transportation? No   Unable to get utilities (heat,electricity)? No         4/23/2025   Fall Risk   Fallen 2 or more times in the past year? No   Trouble with walking or balance? No          4/23/2025   Dental   Dentist two times every year? Yes       Today's PHQ-9 Score:       4/23/2025     8:56 AM   PHQ-9 SCORE   PHQ-9 Total Score MyChart 9 (Mild depression)   PHQ-9 Total Score 9        Patient-reported         4/23/2025   Substance Use   Alcohol more than 3/day or more than 7/wk No   Do you use any other substances recreationally? No     Social History     Tobacco Use    Smoking status: Never    Smokeless tobacco: Never   Vaping Use    Vaping status: Never Used   Substance Use Topics    Alcohol use: Yes     Comment: Alcoholic Drinks/day: Not much!    Drug use: No           9/20/2024   LAST FHS-7 RESULTS   1st degree relative breast or ovarian cancer YesMother and sister   Any relative bilateral breast cancer No   Any male have breast cancer No   Any ONE woman have BOTH breast AND ovarian cancer No   Any woman with breast cancer before 50yrs No   2 or more relatives with breast AND/OR ovarian cancer Yes     Sister did genetic counseling- was told not genetic cancer                 4/23/2025   STI Screening   New sexual partner(s) since last STI/HIV test? No     History of abnormal Pap smear: No - age 30-64 HPV with reflex Pap every 5 years recommended        Latest Ref Rng & Units 6/24/2024     "11:01 AM 6/3/2019    12:20 PM 12/29/2014    10:54 AM   PAP / HPV   PAP  Negative for Intraepithelial Lesion or Malignancy (NILM)  Negative for squamous intraepithelial lesion or malignancy  Electronically signed by Genaro Saenz CT (ASCP) on 6/12/2019 at  4:11 PM    Negative for squamous intraepithelial lesion or malignancy  Electronically signed by Adele Howell CT (ASCP) on 1/7/2015 at 12:59 PM      HPV 16 DNA Negative Negative  Negative     HPV 18 DNA Negative Negative  Negative     Other HR HPV Negative Negative  Negative       ASCVD Risk   The 10-year ASCVD risk score (Venkat EDWARDS, et al., 2019) is: 3.3%    Values used to calculate the score:      Age: 59 years      Sex: Female      Is Non- : No      Diabetic: No      Tobacco smoker: No      Systolic Blood Pressure: 122 mmHg      Is BP treated: Yes      HDL Cholesterol: 81 mg/dL      Total Cholesterol: 248 mg/dL           Reviewed and updated as needed this visit by Provider   Tobacco  Allergies  Meds  Problems  Med Hx  Surg Hx  Fam Hx                     Objective    Exam  /62 (BP Location: Left arm, Patient Position: Sitting, Cuff Size: Adult Regular)   Pulse 53   Temp 98.3  F (36.8  C) (Tympanic)   Resp 15   Ht 1.651 m (5' 5\")   Wt 60.3 kg (133 lb)   LMP  (LMP Unknown)   SpO2 100%   BMI 22.13 kg/m     Estimated body mass index is 22.13 kg/m  as calculated from the following:    Height as of this encounter: 1.651 m (5' 5\").    Weight as of this encounter: 60.3 kg (133 lb).    Physical Exam  GENERAL: alert and no distress  EYES: Eyes grossly normal to inspection, PERRL and conjunctivae and sclerae normal  HENT: ear canals and TM's normal, nose and mouth without ulcers or lesions  NECK: no adenopathy, no asymmetry, masses, or scars  RESP: lungs clear to auscultation - no rales, rhonchi or wheezes  CV: regular rate and rhythm, normal S1 S2, no S3 or S4, no murmur, click or rub, no peripheral " edema  ABDOMEN: soft, nontender, no hepatosplenomegaly, no masses and bowel sounds normal  MS: no gross musculoskeletal defects noted, no edema  SKIN: no suspicious lesions or rashes  NEURO: Normal strength and tone, mentation intact and speech normal  PSYCH: mentation appears normal, affect normal/bright        Signed Electronically by: GONSALO Felix CNP

## 2025-04-24 ENCOUNTER — PATIENT OUTREACH (OUTPATIENT)
Dept: CARE COORDINATION | Facility: CLINIC | Age: 60
End: 2025-04-24
Payer: COMMERCIAL

## 2025-04-24 LAB
ANION GAP SERPL CALCULATED.3IONS-SCNC: 9 MMOL/L (ref 7–15)
BUN SERPL-MCNC: 9.8 MG/DL (ref 8–23)
CALCIUM SERPL-MCNC: 10.3 MG/DL (ref 8.8–10.4)
CHLORIDE SERPL-SCNC: 96 MMOL/L (ref 98–107)
CHOLEST SERPL-MCNC: 232 MG/DL
CREAT SERPL-MCNC: 0.71 MG/DL (ref 0.51–0.95)
EGFRCR SERPLBLD CKD-EPI 2021: >90 ML/MIN/1.73M2
FASTING STATUS PATIENT QL REPORTED: ABNORMAL
FASTING STATUS PATIENT QL REPORTED: ABNORMAL
GLUCOSE SERPL-MCNC: 99 MG/DL (ref 70–99)
HCO3 SERPL-SCNC: 28 MMOL/L (ref 22–29)
HDLC SERPL-MCNC: 86 MG/DL
LDLC SERPL CALC-MCNC: 136 MG/DL
NONHDLC SERPL-MCNC: 146 MG/DL
POTASSIUM SERPL-SCNC: 4.4 MMOL/L (ref 3.4–5.3)
SODIUM SERPL-SCNC: 133 MMOL/L (ref 135–145)
TRIGL SERPL-MCNC: 50 MG/DL
TSH SERPL DL<=0.005 MIU/L-ACNC: 1.22 UIU/ML (ref 0.3–4.2)
VIT D+METAB SERPL-MCNC: 44 NG/ML (ref 20–50)

## 2025-04-30 ENCOUNTER — ANCILLARY PROCEDURE (OUTPATIENT)
Dept: BONE DENSITY | Facility: CLINIC | Age: 60
End: 2025-04-30
Attending: NURSE PRACTITIONER
Payer: COMMERCIAL

## 2025-04-30 DIAGNOSIS — Z78.0 POST-MENOPAUSAL: ICD-10-CM

## 2025-04-30 PROCEDURE — 77091 TBS TECHL CALCULATION ONLY: CPT

## 2025-04-30 PROCEDURE — 77080 DXA BONE DENSITY AXIAL: CPT | Mod: TC

## 2025-05-19 ENCOUNTER — OFFICE VISIT (OUTPATIENT)
Dept: PEDIATRICS | Facility: CLINIC | Age: 60
End: 2025-05-19
Payer: COMMERCIAL

## 2025-05-19 ENCOUNTER — HOSPITAL ENCOUNTER (OUTPATIENT)
Dept: GENERAL RADIOLOGY | Facility: HOSPITAL | Age: 60
Discharge: HOME | End: 2025-05-19
Attending: FAMILY MEDICINE | Admitting: FAMILY MEDICINE
Payer: COMMERCIAL

## 2025-05-19 VITALS
SYSTOLIC BLOOD PRESSURE: 149 MMHG | HEART RATE: 63 BPM | TEMPERATURE: 98.3 F | HEIGHT: 65 IN | BODY MASS INDEX: 21.94 KG/M2 | RESPIRATION RATE: 16 BRPM | WEIGHT: 131.7 LBS | OXYGEN SATURATION: 100 % | DIASTOLIC BLOOD PRESSURE: 98 MMHG

## 2025-05-19 DIAGNOSIS — R07.89 ATYPICAL CHEST PAIN: ICD-10-CM

## 2025-05-19 DIAGNOSIS — R00.2 PALPITATIONS: Primary | ICD-10-CM

## 2025-05-19 DIAGNOSIS — R06.09 DYSPNEA ON EXERTION: ICD-10-CM

## 2025-05-19 DIAGNOSIS — R79.89 ELEVATED TROPONIN: ICD-10-CM

## 2025-05-19 DIAGNOSIS — I10 HYPERTENSION, UNSPECIFIED TYPE: ICD-10-CM

## 2025-05-19 DIAGNOSIS — R00.2 PALPITATIONS: ICD-10-CM

## 2025-05-19 LAB
ALBUMIN SERPL-MCNC: 4 G/DL (ref 3.4–5)
ALP SERPL-CCNC: 97 U/L (ref 40–150)
ALT SERPL W P-5'-P-CCNC: 25 U/L (ref 0–50)
ANION GAP SERPL CALCULATED.3IONS-SCNC: 11 MMOL/L (ref 3–14)
AST SERPL W P-5'-P-CCNC: 29 U/L (ref 0–45)
BASOPHILS # BLD AUTO: 0 10E3/UL (ref 0–0.2)
BASOPHILS NFR BLD AUTO: 0 %
BILIRUB SERPL-MCNC: 0.7 MG/DL (ref 0.2–1.3)
BUN SERPL-MCNC: 11 MG/DL (ref 7–30)
CALCIUM SERPL-MCNC: 10.7 MG/DL (ref 8.5–10.1)
CHLORIDE BLD-SCNC: 100 MMOL/L (ref 94–109)
CO2 SERPL-SCNC: 30 MMOL/L (ref 20–32)
CREAT SERPL-MCNC: 0.7 MG/DL (ref 0.52–1.04)
D DIMER PPP FEU-MCNC: <0.27 UG/ML FEU (ref 0–0.5)
EGFRCR SERPLBLD CKD-EPI 2021: >90 ML/MIN/1.73M2
EOSINOPHIL # BLD AUTO: 0.1 10E3/UL (ref 0–0.7)
EOSINOPHIL NFR BLD AUTO: 1 %
ERYTHROCYTE [DISTWIDTH] IN BLOOD BY AUTOMATED COUNT: 12.1 % (ref 10–15)
GLUCOSE BLD-MCNC: 114 MG/DL (ref 70–99)
HCT VFR BLD AUTO: 42.9 % (ref 35–47)
HGB BLD-MCNC: 14.4 G/DL (ref 11.7–15.7)
IMM GRANULOCYTES # BLD: 0 10E3/UL
IMM GRANULOCYTES NFR BLD: 0 %
LYMPHOCYTES # BLD AUTO: 1.2 10E3/UL (ref 0.8–5.3)
LYMPHOCYTES NFR BLD AUTO: 24 %
MCH RBC QN AUTO: 30 PG (ref 26.5–33)
MCHC RBC AUTO-ENTMCNC: 33.6 G/DL (ref 31.5–36.5)
MCV RBC AUTO: 89 FL (ref 78–100)
MONOCYTES # BLD AUTO: 0.3 10E3/UL (ref 0–1.3)
MONOCYTES NFR BLD AUTO: 7 %
NEUTROPHILS # BLD AUTO: 3.5 10E3/UL (ref 1.6–8.3)
NEUTROPHILS NFR BLD AUTO: 68 %
PLATELET # BLD AUTO: 313 10E3/UL (ref 150–450)
POTASSIUM BLD-SCNC: 5 MMOL/L (ref 3.4–5.3)
PROT SERPL-MCNC: 7.5 G/DL (ref 6.8–8.8)
RBC # BLD AUTO: 4.8 10E6/UL (ref 3.8–5.2)
SODIUM SERPL-SCNC: 141 MMOL/L (ref 135–145)
TROPONIN T SERPL HS-MCNC: 15 NG/L
TROPONIN T SERPL HS-MCNC: 15 NG/L
WBC # BLD AUTO: 5.1 10E3/UL (ref 4–11)

## 2025-05-19 PROCEDURE — 84484 ASSAY OF TROPONIN QUANT: CPT | Performed by: FAMILY MEDICINE

## 2025-05-19 PROCEDURE — 71046 X-RAY EXAM CHEST 2 VIEWS: CPT

## 2025-05-19 PROCEDURE — 85025 COMPLETE CBC W/AUTO DIFF WBC: CPT | Performed by: FAMILY MEDICINE

## 2025-05-19 PROCEDURE — 93005 ELECTROCARDIOGRAM TRACING: CPT | Performed by: FAMILY MEDICINE

## 2025-05-19 PROCEDURE — 3077F SYST BP >= 140 MM HG: CPT | Performed by: FAMILY MEDICINE

## 2025-05-19 PROCEDURE — 85379 FIBRIN DEGRADATION QUANT: CPT | Performed by: FAMILY MEDICINE

## 2025-05-19 PROCEDURE — 99215 OFFICE O/P EST HI 40 MIN: CPT | Performed by: FAMILY MEDICINE

## 2025-05-19 PROCEDURE — 93010 ELECTROCARDIOGRAM REPORT: CPT | Performed by: STUDENT IN AN ORGANIZED HEALTH CARE EDUCATION/TRAINING PROGRAM

## 2025-05-19 PROCEDURE — 3080F DIAST BP >= 90 MM HG: CPT | Performed by: FAMILY MEDICINE

## 2025-05-19 PROCEDURE — 80053 COMPREHEN METABOLIC PANEL: CPT | Performed by: FAMILY MEDICINE

## 2025-05-19 PROCEDURE — 36415 COLL VENOUS BLD VENIPUNCTURE: CPT | Performed by: FAMILY MEDICINE

## 2025-05-19 RX ORDER — ASPIRIN 81 MG/1
324 TABLET, CHEWABLE ORAL ONCE
Status: COMPLETED | OUTPATIENT
Start: 2025-05-19 | End: 2025-05-19

## 2025-05-19 RX ADMIN — ASPIRIN 324 MG: 81 TABLET, CHEWABLE ORAL at 10:53

## 2025-05-19 NOTE — PROGRESS NOTES
[facility] Visit    Assessment & Plan     Palpitations  Intermittant palpitation episodes lasting approximately 2 days.  EKG shows only on PAC today in the setting of normal sinus rhythm.  Troponin mildly elevated to 15 and stable 2 hours apart.  D-dimer is negative.  TSH done 3 weeks ago was normal.  CBC and CMP are within normal limits  Ordered a Zio patch and instructed the patient to start the Zio patch once she has another episode starting.  We will do a 7-day Zio patch.  She should make an appointment to follow-up with her primary for results.  Patient verbalizes understanding.  Recommended:  When you feel the next episode coming on then start wearing the zio patch for a week.    Follow up with your doctor after the zio patch is complete for results.  Certainly follow up to clinic or ER if you have new or worrisome symptoms.  - EKG 12-lead, tracing only  - XR Chest 2 Views; Future  - aspirin (ASA) chewable tablet 324 mg  - Troponin T, High Sensitivity; Future  - D dimer quantitative; Future  - CBC with platelets differential; Future  - Comprehensive metabolic panel; Future  - Comprehensive metabolic panel  - CBC with platelets differential  - D dimer quantitative  - Troponin T, High Sensitivity  - Troponin T, High Sensitivity; Future  - Troponin T, High Sensitivity  - ZIO PATCH MAIL OUT; Future    Atypical chest pain  EKG is Within normal limits   Troponin stable at 15  - EKG 12-lead, tracing only  - XR Chest 2 Views; Future  - aspirin (ASA) chewable tablet 324 mg  - Troponin T, High Sensitivity; Future  - D dimer quantitative; Future  - CBC with platelets differential; Future  - Comprehensive metabolic panel; Future  - Comprehensive metabolic panel  - CBC with platelets differential  - D dimer quantitative  - Troponin T, High Sensitivity    Dyspnea on exertion  See above  Normal CXR  - EKG 12-lead, tracing only  - XR Chest 2 Views; Future  - aspirin (ASA) chewable tablet 324 mg  -  "Troponin T, High Sensitivity; Future  - D dimer quantitative; Future  - CBC with platelets differential; Future  - Comprehensive metabolic panel; Future  - Comprehensive metabolic panel  - CBC with platelets differential  - D dimer quantitative  - Troponin T, High Sensitivity    Hypertension, unspecified type  Continue current medication -lisinopril and HCTZ    Elevated troponin  stable  - Troponin T, High Sensitivity; Future  - Troponin T, High Sensitivity    Over 45 minutes were spent doing chart review, history and exam, documentation and further activities per the note.             Mitra Reyna is a 59 year old, presenting for the following health issues:  Heart Problem    HPI      Concern - bradycardia  Onset: patient states that my heart just feels off  Description: heart rhythm started 2 days, GERD started 2 weeks ago (new)  Intensity: severe  Progression of Symptoms:  worsening - worse today than yesterday  Accompanying Signs & Symptoms: nausea, dizziness going up the stairs this morning, SOB - took a walk and it did seem that I have to breath deeper  Previous history of similar problem: bradycardia  Precipitating factors:        Worsened by: maybe anxiety  Alleviating factors:        Improved by: None  Therapies tried and outcome: None    Patient is a 59-year-old female with hypertension who complains of her palpitations and episodes where it feels like her heart is \"working hard\".  It feels like her rhythm is off.  She has bouts of nausea with this as well and it has been going on for a couple of weeks.  Much worse the last 2 days.  She also gets a full feeling up in her chest.  Tums seems to help.  It does not feel like a burning.  No significant chest pain or tightness but she will get a brief twinge of chest pain on the left side.  No back or shoulder pain.  No vomiting.  She felt dizzy going up the stairs this morning.  She gets headaches off-and-on.  She is having some dyspnea with exertion.  No " "leg swelling.  No orthopnea.  Some visual changes over the last several months but she has been dealing with posterior vitreous detachment and has had a left eye floater for the last 9 months.  No abdominal pain.  No pain in her extremities.  Her appetite is fine.  She has been using CPAP since August 2024    She had her annual physical April 23, 2025 and described having a similar episode a few weeks prior to her physical exam.  Her EKG at her physical was noted to have a sinus bradycardia.  Her physician asked her to follow-up in urgent care if she had another episode.    Family history positive for:  Maternal grandfather MI  Paternal grandfather angina  CVA mom and dad    Thyroid testing 3 weeks ago was normal.    Review of Systems  Negative except as listed in HPI      Objective    BP (!) 149/98 (BP Location: Right arm, Patient Position: Sitting, Cuff Size: Adult Regular)   Pulse 63   Temp 98.3  F (36.8  C) (Oral)   Resp 16   Ht 1.651 m (5' 5\")   Wt 59.7 kg (131 lb 11.2 oz)   LMP  (LMP Unknown)   SpO2 100%   BMI 21.92 kg/m    Body mass index is 21.92 kg/m .  Physical Exam   Vitals are noted and within normal limits except for elevated blood pressure today  In general she is alert, oriented, and in no acute distress  Mouth mucous members are pink and moist  Neck is supple with no cervical lymphadenopathy and no thyromegaly  Heart has a regular rate and rhythm with just 1 skipped beat.  No murmurs.  Lungs are clear to auscultation bilaterally with good air entry  Abdomen is soft and nontender  Radial and posterior tibial pulses are normal and equal bilaterally  Legs with no pedal edema  EKG has a normal sinus rhythm at a rate of 60 with no acute ST-T wave changes.  There is one early atrial beat.  Chest x-ray: clear   Troponin: Elevated to 15 x 2, stable, 2 hours apart  D-dimer: Negative  CBC: Within normal limits with no anemia or sign of infection  CMP: Within normal limits with notably improved " hyponatremia.  Normal renal function.  Mildly elevated calcium and glucose.  Liver functions are normal.  Results for orders placed or performed during the hospital encounter of 05/19/25   XR Chest 2 Views     Status: None    Narrative    EXAM: XR CHEST 2 VIEWS  LOCATION: [facility]  DATE: 5/19/2025    INDICATION:  Palpitations, Atypical chest pain, Dyspnea on exertion  COMPARISON: 8/13/2019      Impression    IMPRESSION: Negative chest.   Results for orders placed or performed in visit on 05/19/25   Comprehensive metabolic panel     Status: Abnormal   Result Value Ref Range    Sodium 141 135 - 145 mmol/L    Potassium 5.0 3.4 - 5.3 mmol/L    Chloride 100 94 - 109 mmol/L    Carbon Dioxide (CO2) 30 20 - 32 mmol/L    Anion Gap 11 3 - 14 mmol/L    Urea Nitrogen 11 7 - 30 mg/dL    Creatinine 0.70 0.52 - 1.04 mg/dL    GFR Estimate >90 >60 mL/min/1.73m2    Calcium 10.7 (H) 8.5 - 10.1 mg/dL    Glucose 114 (H) 70 - 99 mg/dL    Alkaline Phosphatase 97 40 - 150 U/L    AST 29 0 - 45 U/L    ALT 25 0 - 50 U/L    Protein Total 7.5 6.8 - 8.8 g/dL    Albumin 4.0 3.4 - 5.0 g/dL    Bilirubin Total 0.7 0.2 - 1.3 mg/dL   D dimer quantitative     Status: Normal   Result Value Ref Range    D-Dimer Quantitative <0.27 0.00 - 0.50 ug/mL FEU    Narrative    This D-dimer assay is intended for use in conjunction with a clinical pretest probability assessment model to exclude pulmonary embolism (PE) and deep venous thrombosis (DVT) in outpatients suspected of PE or DVT. The cut-off value is 0.50 ug/mL FEU.    For patients 50 years of age or older, the application of age-adjusted cut-off values for D-Dimer may increase the specificity without significant effect on sensitivity. The literature suggested calculation age adjusted cut-off in ug/L = age in years x 10 ug/L. The results in this laboratory are reported as ug/mL rather than ug/L. The calculation for age adjusted cut off in ug/mL= age in years x 0.01 ug/mL. For  example, the cut off for a 76 year old male is 76 x 0.01 ug/mL = 0.76 ug/mL (760 ug/L).    M Amy et al. Age adjusted D-dimer cut-off levels to rule out pulmonary embolism: The ADJUST-PE Study. MIGUEL ÁNGEL 2014;311:2992-5090.; HJ Altagracia et al. Diagnostic accuracy of conventional or age adjusted D-dimer cutoff values in older patients with suspected venous thromboembolism. Systemic review and meta-analysis. BMJ 2013:346:f2492.   Troponin T, High Sensitivity     Status: Abnormal   Result Value Ref Range    Troponin T, High Sensitivity 15 (H) <=14 ng/L   CBC with platelets and differential     Status: None   Result Value Ref Range    WBC Count 5.1 4.0 - 11.0 10e3/uL    RBC Count 4.80 3.80 - 5.20 10e6/uL    Hemoglobin 14.4 11.7 - 15.7 g/dL    Hematocrit 42.9 35.0 - 47.0 %    MCV 89 78 - 100 fL    MCH 30.0 26.5 - 33.0 pg    MCHC 33.6 31.5 - 36.5 g/dL    RDW 12.1 10.0 - 15.0 %    Platelet Count 313 150 - 450 10e3/uL    % Neutrophils 68 %    % Lymphocytes 24 %    % Monocytes 7 %    % Eosinophils 1 %    % Basophils 0 %    % Immature Granulocytes 0 %    Absolute Neutrophils 3.5 1.6 - 8.3 10e3/uL    Absolute Lymphocytes 1.2 0.8 - 5.3 10e3/uL    Absolute Monocytes 0.3 0.0 - 1.3 10e3/uL    Absolute Eosinophils 0.1 0.0 - 0.7 10e3/uL    Absolute Basophils 0.0 0.0 - 0.2 10e3/uL    Absolute Immature Granulocytes 0.0 <=0.4 10e3/uL   Troponin T, High Sensitivity     Status: Abnormal   Result Value Ref Range    Troponin T, High Sensitivity 15 (H) <=14 ng/L   EKG 12-lead, tracing only     Status: None (Preliminary result)   Result Value Ref Range    Systolic Blood Pressure  mmHg    Diastolic Blood Pressure  mmHg    Ventricular Rate 60 BPM    Atrial Rate 60 BPM    RI Interval 118 ms    QRS Duration 88 ms     ms    QTc 428 ms    P Axis 10 degrees    R AXIS 16 degrees    T Axis 38 degrees    Interpretation ECG       Sinus rhythm with Premature atrial complexes  Otherwise normal ECG  When compared with ECG of 23-Apr-2025  10:22,  Premature atrial complexes are now Present     CBC with platelets differential     Status: None    Narrative    The following orders were created for panel order CBC with platelets differential.  Procedure                               Abnormality         Status                     ---------                               -----------         ------                     CBC with platelets and ...[[id number]]                      Final result                 Please view results for these tests on the individual orders.               Signed Electronically by: ANITA ADS PROVIDER

## 2025-05-19 NOTE — PATIENT INSTRUCTIONS
When you feel the next episode coming on then start wearing the zio patch for a week.    Follow up with your doctor after the zio patch is complete for results.    Certainly follow up to clinic or ER if you have new or worrisome symptoms.

## 2025-05-20 LAB
ATRIAL RATE - MUSE: 60 BPM
DIASTOLIC BLOOD PRESSURE - MUSE: NORMAL MMHG
INTERPRETATION ECG - MUSE: NORMAL
P AXIS - MUSE: 10 DEGREES
PR INTERVAL - MUSE: 118 MS
QRS DURATION - MUSE: 88 MS
QT - MUSE: 428 MS
QTC - MUSE: 428 MS
R AXIS - MUSE: 16 DEGREES
SYSTOLIC BLOOD PRESSURE - MUSE: NORMAL MMHG
T AXIS - MUSE: 38 DEGREES
VENTRICULAR RATE- MUSE: 60 BPM

## 2025-06-26 ENCOUNTER — ALLIED HEALTH/NURSE VISIT (OUTPATIENT)
Dept: FAMILY MEDICINE | Facility: CLINIC | Age: 60
End: 2025-06-26
Payer: COMMERCIAL

## 2025-06-26 VITALS — TEMPERATURE: 97.6 F

## 2025-06-26 DIAGNOSIS — Z23 ENCOUNTER FOR IMMUNIZATION: Primary | ICD-10-CM

## 2025-06-26 NOTE — PROGRESS NOTES
Prior to immunization administration, verified patients identity using patient s name and date of birth. Please see Immunization Activity for additional information.     Is the patient's temperature normal (100.5 or less)? Yes     Patient MEETS CRITERIA. PROCEED with vaccine administration.      Pt tolerated injections. Site was cleansed with alcohol prior to injections. No pain, burning, swelling or redness at the site of the injection. Patient instructed to remain in clinic for 15 minutes afterwards, and to report any adverse reactions.    Link to Ancillary Visit Immunization Standing Orders SmartSet     Screening performed by Faby Kaufman RN on 6/26/2025 at 10:48 AM.

## 2025-07-29 ENCOUNTER — PATIENT OUTREACH (OUTPATIENT)
Dept: CARE COORDINATION | Facility: CLINIC | Age: 60
End: 2025-07-29
Payer: COMMERCIAL

## 2025-07-31 ENCOUNTER — PATIENT OUTREACH (OUTPATIENT)
Dept: CARE COORDINATION | Facility: CLINIC | Age: 60
End: 2025-07-31
Payer: COMMERCIAL